# Patient Record
Sex: FEMALE | Race: WHITE | NOT HISPANIC OR LATINO | URBAN - METROPOLITAN AREA
[De-identification: names, ages, dates, MRNs, and addresses within clinical notes are randomized per-mention and may not be internally consistent; named-entity substitution may affect disease eponyms.]

---

## 2021-11-28 ENCOUNTER — INPATIENT (INPATIENT)
Facility: HOSPITAL | Age: 36
LOS: 18 days | Discharge: HOME | DRG: 885 | End: 2021-12-17
Attending: PSYCHIATRY & NEUROLOGY | Admitting: PSYCHIATRY & NEUROLOGY
Payer: SELF-PAY

## 2021-11-28 VITALS
HEART RATE: 84 BPM | TEMPERATURE: 98 F | RESPIRATION RATE: 19 BRPM | HEIGHT: 64 IN | OXYGEN SATURATION: 100 % | WEIGHT: 139.99 LBS | SYSTOLIC BLOOD PRESSURE: 159 MMHG | DIASTOLIC BLOOD PRESSURE: 80 MMHG

## 2021-11-28 DIAGNOSIS — F25.9 SCHIZOAFFECTIVE DISORDER, UNSPECIFIED: ICD-10-CM

## 2021-11-28 DIAGNOSIS — F10.20 ALCOHOL DEPENDENCE, UNCOMPLICATED: ICD-10-CM

## 2021-11-28 LAB
ALBUMIN SERPL ELPH-MCNC: 3.7 G/DL — SIGNIFICANT CHANGE UP (ref 3.3–5)
ALBUMIN SERPL ELPH-MCNC: 4.1 G/DL — SIGNIFICANT CHANGE UP (ref 3.3–5)
ALP SERPL-CCNC: 51 U/L — SIGNIFICANT CHANGE UP (ref 40–120)
ALP SERPL-CCNC: 53 U/L — SIGNIFICANT CHANGE UP (ref 40–120)
ALT FLD-CCNC: 16 U/L — SIGNIFICANT CHANGE UP (ref 10–45)
ALT FLD-CCNC: 17 U/L — SIGNIFICANT CHANGE UP (ref 10–45)
AMPHET UR-MCNC: NEGATIVE — SIGNIFICANT CHANGE UP
ANION GAP SERPL CALC-SCNC: 13 MMOL/L — SIGNIFICANT CHANGE UP (ref 5–17)
APAP SERPL-MCNC: <5 UG/ML — LOW (ref 10–30)
APPEARANCE UR: CLEAR — SIGNIFICANT CHANGE UP
AST SERPL-CCNC: 25 U/L — SIGNIFICANT CHANGE UP (ref 10–40)
AST SERPL-CCNC: 30 U/L — SIGNIFICANT CHANGE UP (ref 10–40)
BACTERIA # UR AUTO: PRESENT /HPF
BARBITURATES UR SCN-MCNC: NEGATIVE — SIGNIFICANT CHANGE UP
BASOPHILS # BLD AUTO: 0.06 K/UL — SIGNIFICANT CHANGE UP (ref 0–0.2)
BASOPHILS NFR BLD AUTO: 1.1 % — SIGNIFICANT CHANGE UP (ref 0–2)
BENZODIAZ UR-MCNC: NEGATIVE — SIGNIFICANT CHANGE UP
BILIRUB DIRECT SERPL-MCNC: 0.2 MG/DL — SIGNIFICANT CHANGE UP (ref 0–0.3)
BILIRUB INDIRECT FLD-MCNC: 0 MG/DL — LOW (ref 0.2–1)
BILIRUB SERPL-MCNC: 0.2 MG/DL — SIGNIFICANT CHANGE UP (ref 0.2–1.2)
BILIRUB SERPL-MCNC: 0.4 MG/DL — SIGNIFICANT CHANGE UP (ref 0.2–1.2)
BILIRUB UR-MCNC: NEGATIVE — SIGNIFICANT CHANGE UP
BUN SERPL-MCNC: 9 MG/DL — SIGNIFICANT CHANGE UP (ref 7–23)
CALCIUM SERPL-MCNC: 8.7 MG/DL — SIGNIFICANT CHANGE UP (ref 8.4–10.5)
CHLORIDE SERPL-SCNC: 105 MMOL/L — SIGNIFICANT CHANGE UP (ref 96–108)
CO2 SERPL-SCNC: 23 MMOL/L — SIGNIFICANT CHANGE UP (ref 22–31)
COCAINE METAB.OTHER UR-MCNC: NEGATIVE — SIGNIFICANT CHANGE UP
COLOR SPEC: YELLOW — SIGNIFICANT CHANGE UP
CREAT SERPL-MCNC: 0.55 MG/DL — SIGNIFICANT CHANGE UP (ref 0.5–1.3)
DIFF PNL FLD: ABNORMAL
EOSINOPHIL # BLD AUTO: 0.1 K/UL — SIGNIFICANT CHANGE UP (ref 0–0.5)
EOSINOPHIL NFR BLD AUTO: 1.9 % — SIGNIFICANT CHANGE UP (ref 0–6)
EPI CELLS # UR: ABNORMAL /HPF (ref 0–5)
ETHANOL SERPL-MCNC: <10 MG/DL — SIGNIFICANT CHANGE UP (ref 0–10)
GLUCOSE SERPL-MCNC: 107 MG/DL — HIGH (ref 70–99)
GLUCOSE UR QL: NEGATIVE — SIGNIFICANT CHANGE UP
HCG UR QL: NEGATIVE — SIGNIFICANT CHANGE UP
HCT VFR BLD CALC: 28.7 % — LOW (ref 34.5–45)
HGB BLD-MCNC: 9.2 G/DL — LOW (ref 11.5–15.5)
IMM GRANULOCYTES NFR BLD AUTO: 0.4 % — SIGNIFICANT CHANGE UP (ref 0–1.5)
KETONES UR-MCNC: NEGATIVE — SIGNIFICANT CHANGE UP
LEUKOCYTE ESTERASE UR-ACNC: NEGATIVE — SIGNIFICANT CHANGE UP
LYMPHOCYTES # BLD AUTO: 1.52 K/UL — SIGNIFICANT CHANGE UP (ref 1–3.3)
LYMPHOCYTES # BLD AUTO: 28.7 % — SIGNIFICANT CHANGE UP (ref 13–44)
MCHC RBC-ENTMCNC: 24.7 PG — LOW (ref 27–34)
MCHC RBC-ENTMCNC: 32.1 GM/DL — SIGNIFICANT CHANGE UP (ref 32–36)
MCV RBC AUTO: 76.9 FL — LOW (ref 80–100)
METHADONE UR-MCNC: NEGATIVE — SIGNIFICANT CHANGE UP
MONOCYTES # BLD AUTO: 0.43 K/UL — SIGNIFICANT CHANGE UP (ref 0–0.9)
MONOCYTES NFR BLD AUTO: 8.1 % — SIGNIFICANT CHANGE UP (ref 2–14)
NEUTROPHILS # BLD AUTO: 3.16 K/UL — SIGNIFICANT CHANGE UP (ref 1.8–7.4)
NEUTROPHILS NFR BLD AUTO: 59.8 % — SIGNIFICANT CHANGE UP (ref 43–77)
NITRITE UR-MCNC: NEGATIVE — SIGNIFICANT CHANGE UP
NRBC # BLD: 0 /100 WBCS — SIGNIFICANT CHANGE UP (ref 0–0)
OPIATES UR-MCNC: NEGATIVE — SIGNIFICANT CHANGE UP
PCP SPEC-MCNC: SIGNIFICANT CHANGE UP
PCP UR-MCNC: NEGATIVE — SIGNIFICANT CHANGE UP
PH UR: 6.5 — SIGNIFICANT CHANGE UP (ref 5–8)
PLATELET # BLD AUTO: 313 K/UL — SIGNIFICANT CHANGE UP (ref 150–400)
POTASSIUM SERPL-MCNC: 3.7 MMOL/L — SIGNIFICANT CHANGE UP (ref 3.5–5.3)
POTASSIUM SERPL-SCNC: 3.7 MMOL/L — SIGNIFICANT CHANGE UP (ref 3.5–5.3)
PROT SERPL-MCNC: 6.4 G/DL — SIGNIFICANT CHANGE UP (ref 6–8.3)
PROT SERPL-MCNC: 7 G/DL — SIGNIFICANT CHANGE UP (ref 6–8.3)
PROT UR-MCNC: NEGATIVE MG/DL — SIGNIFICANT CHANGE UP
RBC # BLD: 3.73 M/UL — LOW (ref 3.8–5.2)
RBC # FLD: 18 % — HIGH (ref 10.3–14.5)
RBC CASTS # UR COMP ASSIST: ABNORMAL /HPF
SALICYLATES SERPL-MCNC: <0.3 MG/DL — LOW (ref 2.8–20)
SARS-COV-2 RNA SPEC QL NAA+PROBE: SIGNIFICANT CHANGE UP
SODIUM SERPL-SCNC: 141 MMOL/L — SIGNIFICANT CHANGE UP (ref 135–145)
SP GR SPEC: 1.02 — SIGNIFICANT CHANGE UP (ref 1–1.03)
THC UR QL: NEGATIVE — SIGNIFICANT CHANGE UP
TSH SERPL-MCNC: 1.54 UIU/ML — SIGNIFICANT CHANGE UP (ref 0.27–4.2)
UROBILINOGEN FLD QL: 0.2 E.U./DL — SIGNIFICANT CHANGE UP
WBC # BLD: 5.29 K/UL — SIGNIFICANT CHANGE UP (ref 3.8–10.5)
WBC # FLD AUTO: 5.29 K/UL — SIGNIFICANT CHANGE UP (ref 3.8–10.5)
WBC UR QL: < 5 /HPF — SIGNIFICANT CHANGE UP

## 2021-11-28 PROCEDURE — 99285 EMERGENCY DEPT VISIT HI MDM: CPT

## 2021-11-28 PROCEDURE — 90792 PSYCH DIAG EVAL W/MED SRVCS: CPT | Mod: 95

## 2021-11-28 PROCEDURE — 99223 1ST HOSP IP/OBS HIGH 75: CPT

## 2021-11-28 RX ORDER — HALOPERIDOL DECANOATE 100 MG/ML
5 INJECTION INTRAMUSCULAR ONCE
Refills: 0 | Status: DISCONTINUED | OUTPATIENT
Start: 2021-11-28 | End: 2021-11-28

## 2021-11-28 RX ORDER — QUETIAPINE FUMARATE 200 MG/1
50 TABLET, FILM COATED ORAL AT BEDTIME
Refills: 0 | Status: DISCONTINUED | OUTPATIENT
Start: 2021-11-28 | End: 2021-12-17

## 2021-11-28 RX ORDER — FOLIC ACID 0.8 MG
1 TABLET ORAL DAILY
Refills: 0 | Status: DISCONTINUED | OUTPATIENT
Start: 2021-11-28 | End: 2021-12-17

## 2021-11-28 RX ORDER — THIAMINE MONONITRATE (VIT B1) 100 MG
100 TABLET ORAL DAILY
Refills: 0 | Status: COMPLETED | OUTPATIENT
Start: 2021-11-28 | End: 2021-12-01

## 2021-11-28 RX ORDER — DIPHENHYDRAMINE HCL 50 MG
50 CAPSULE ORAL ONCE
Refills: 0 | Status: DISCONTINUED | OUTPATIENT
Start: 2021-11-28 | End: 2021-11-28

## 2021-11-28 RX ORDER — QUETIAPINE FUMARATE 200 MG/1
100 TABLET, FILM COATED ORAL AT BEDTIME
Refills: 0 | Status: DISCONTINUED | OUTPATIENT
Start: 2021-11-28 | End: 2021-11-30

## 2021-11-28 RX ORDER — NICOTINE POLACRILEX 2 MG
2 GUM BUCCAL
Refills: 0 | Status: DISCONTINUED | OUTPATIENT
Start: 2021-11-28 | End: 2021-12-17

## 2021-11-28 RX ORDER — MAGNESIUM HYDROXIDE 400 MG/1
30 TABLET, CHEWABLE ORAL DAILY
Refills: 0 | Status: DISCONTINUED | OUTPATIENT
Start: 2021-11-28 | End: 2021-12-17

## 2021-11-28 RX ORDER — HALOPERIDOL DECANOATE 100 MG/ML
5 INJECTION INTRAMUSCULAR EVERY 6 HOURS
Refills: 0 | Status: DISCONTINUED | OUTPATIENT
Start: 2021-11-28 | End: 2021-12-17

## 2021-11-28 RX ORDER — ACETAMINOPHEN 500 MG
650 TABLET ORAL EVERY 6 HOURS
Refills: 0 | Status: DISCONTINUED | OUTPATIENT
Start: 2021-11-28 | End: 2021-12-17

## 2021-11-28 RX ADMIN — Medication 650 MILLIGRAM(S): at 19:28

## 2021-11-28 RX ADMIN — Medication 650 MILLIGRAM(S): at 20:58

## 2021-11-28 RX ADMIN — Medication 2 MILLIGRAM(S): at 21:16

## 2021-11-28 RX ADMIN — QUETIAPINE FUMARATE 100 MILLIGRAM(S): 200 TABLET, FILM COATED ORAL at 21:16

## 2021-11-28 NOTE — ED PROVIDER NOTE - OBJECTIVE STATEMENT
The pt is a 37 y/o F, who presents to ED c/o SI x 3 mon. On seroquel and has been taking it as Rx'd. Reports suicide attempt a yr ago by hanging herself. States that has recently lost custody of her kids, has "been traveling". Admits to drinking alcohol - last drink a few hrs ago, no hx of withdrawal sz. Has been hosp in psych unit x 3-4 times. Denies HI, AH, VH, anorexia, insomnia, cp, sob, n/v/d, any physical c/o.

## 2021-11-28 NOTE — BH INPATIENT PSYCHIATRY ASSESSMENT NOTE - NSTXPSYCHOGOAL_PSY_ALL_CORE
Be able to utilize coping skills to ignore hallucinations so that he/she could attend and participate constructively in groups

## 2021-11-28 NOTE — BH INPATIENT PSYCHIATRY ASSESSMENT NOTE - RISK ASSESSMENT
Protective factors include responsibility to children. Risk factors include poor compliance with outpatient care, hx of prior inpatient hospitalizations.

## 2021-11-28 NOTE — BH INPATIENT PSYCHIATRY ASSESSMENT NOTE - NSBHASSESSSUMMFT_PSY_ALL_CORE
37 YO F with hx of schizoaffective d/o and likely alcohol use disorder comes to ED with vague suicidal ideation sometimes saying she will jump into traffic at other times saying she'll OD on seroquel. Tangential, oddly related, elevated, +AH, and came out to [facility] suddenly to come to parade.     1)Start seroquel 100 mg qhs for schizoaffective d/o.     2)[facility] protocol    3)Obtain surgery consult to evaluate ganglion cyst of hand.

## 2021-11-28 NOTE — BH INPATIENT PSYCHIATRY ASSESSMENT NOTE - NSBHMETABOLIC_PSY_ALL_CORE_FT
BMI: BMI (kg/m2): 24 (11-28-21 @ 03:23)  HbA1c:   Glucose:   BP: 122/78 (11-28-21 @ 09:30) (122/78 - 159/80)  Lipid Panel:

## 2021-11-28 NOTE — ED ADULT TRIAGE NOTE - OTHER COMPLAINTS
CC of SI x 3 mos on seroquel +HI, +SI thinking of jumping on the traffic to end her life for she lost custody of her kids. looks disheveled

## 2021-11-28 NOTE — BH INPATIENT PSYCHIATRY ASSESSMENT NOTE - OTHER PAST PSYCHIATRIC HISTORY (INCLUDE DETAILS REGARDING ONSET, COURSE OF ILLNESS, INPATIENT/OUTPATIENT TREATMENT)
5 past psych hospitalizations in [place], hx of schizoaffective, No past suicide attempts, denies hx of violence.

## 2021-11-28 NOTE — ED ADULT NURSE REASSESSMENT NOTE - NS ED NURSE REASSESS COMMENT FT1
PT APPEARS TO BE SLEEPING COMFORTABLY IN RECLINER CHAIR. BREATHING EVEN AND UNLABORED. NO SIGNS OF DISTRESS.

## 2021-11-28 NOTE — BH INPATIENT PSYCHIATRY ASSESSMENT NOTE - OTHER
L ganglion cyst where 3d & 4th metacarpals meet fingers, painful & swollen. No signs of infection  not done odd

## 2021-11-28 NOTE — ED BEHAVIORAL HEALTH NOTE - BEHAVIORAL HEALTH NOTE
PRE-HOSPITAL COURSE  ===================  SOURCE:  Second-hand information via EMR documentation and primary RN.  DETAILS: Patient self-presented to the ED no additional pre-hospital course available    ===================  ED COURSE:   SOURCE:  Second-hand information via EMR documentation and primary RN  ARRIVAL:  Patient self-presented to the ED with no noted incidents.  BELONGINGS:  Clothing    BEHAVIOR: Complied with triage protocols –provided blood/urine, changed into a hospital gown, and allowed staff to wand/collect belongings without incident. Patient endorsed SI with thoughts of jumping into traffic. She denies HI, AH, and VH. Patient presents with fair hygiene and appears disheveled. She has been calm in the ED with no aggression or behavioral issues reported.   TREATMENT: No prn medications, restraints, security interventions or manual holds required.   VISITORS: Patient is unaccompanied by family or social supports.   ========================  COLLATERAL ATTEMPT  ========================  NAME: Meaghan Curtis   NUMBER: [phone]   RELATIONSHIP: Mother  COMMENTS: Attempted to reach to obtain collateral       COVID Exposure Screen- collateral (i.e. third-party, chart review, belongings, etc; include EMS and ED staff)  1.	*Has the patient had a COVID-19 test in the last 90 days?  (  ) Yes   (X  ) No   (  ) Unknown- Reason: _____  2.	*Has the patient tested positive for COVID-19 antibodies? (  ) Yes   ( X ) No   (  ) Unknown- Reason: _____  3.	*Has the patient received 2 doses of the COVID-19 vaccine? (X  ) Yes   (  ) No   (  ) Unknown- Reason: _____  IF YES PROCEED TO QUESTION #6. IF NO or UNKNOWN, PLEASE SKIP TO QUESTION #7.  4.	 Date of second dose: __11/3/21______  5.	*In the past 10 days, has the patient been around anyone with a positive COVID-19 test?* (  ) Yes   (  X) No   (  ) Unknown-   6.	*Has the patient been out of [place] within the past 10 days?* (X  ) Yes   (  ) No   (  ) Unknown- Reason: _____  IF YES PLEASE ANSWER THE FOLLOWING QUESTIONS:  7.	Which state/country did they go to? __[place]____  8.	Were they there over 24 hours? (X  ) Yes   (  ) No    (  ) Unknown- Reason: ______  9.	Date of return to [facility]: __11/21/21____

## 2021-11-28 NOTE — BH INPATIENT PSYCHIATRY ASSESSMENT NOTE - NSBHCHARTREVIEWVS_PSY_A_CORE FT
Vital Signs Last 24 Hrs  T(C): 36.8 (11-28-21 @ 09:30), Max: 36.8 (11-28-21 @ 09:30)  T(F): 98.3 (11-28-21 @ 09:30), Max: 98.3 (11-28-21 @ 09:30)  HR: 95 (11-28-21 @ 09:30) (84 - 95)  BP: 122/78 (11-28-21 @ 09:30) (122/78 - 159/80)  BP(mean): --  RR: 18 (11-28-21 @ 09:30) (18 - 19)  SpO2: 98% (11-28-21 @ 09:30) (98% - 100%)

## 2021-11-28 NOTE — ED PROVIDER NOTE - CLINICAL SUMMARY MEDICAL DECISION MAKING FREE TEXT BOX
pt c/o SI - no plan, on seroquel and claims to be compliant, pt disheveled and appears undomicile, admits to drinking alcohol PTA - no hx of withdrawal sz, psych labs to medically clear, psych consult - dispo as per psych 30F who p/w c/o SI - no plan, on seroquel and claims to be compliant, pt disheveled and appears undomicile, admits to drinking alcohol PTA - no hx of withdrawal sz, psych labs to medically clear, psych consult - dispo as per psych, pt signed out to day team pending psych recs.

## 2021-11-28 NOTE — ED PROVIDER NOTE - PROGRESS NOTE DETAILS
pt seen by tele psych -- voluntary admission, pending covid results Shirley: medically cleared and signed out to me pending COVID. COVID neg, will admit psych for further care.
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
## 2021-11-28 NOTE — ED ADULT NURSE NOTE - OBJECTIVE STATEMENT
36 y F, complaining of suicidal thoughts and depression, pt states she came to [place] to look for her aunt, pt does not know where aunt is, pt states when she couldn't find her, she became depressed, pt has hx of depression, and states she wants to speak to a psychologist regarding this. pt denies any acts of harm, denies any ingestions, denies chest pain, sob, nausea, vomiting, fever.
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
## 2021-11-28 NOTE — ED BEHAVIORAL HEALTH ASSESSMENT NOTE - SUMMARY
36 year old, , undomiciled (while in [place], will stay in a room at grandmother's), unemployed Female visiting from [place] with PMH of L. hand cyst and PPHx of schizoaffective do BIB self for concern for suicidal thoughts. In the past 24 hours, patient endorses running into traffic and being told by [facility] that she should go to a psychiatric hospital. Patient's narrative is often times confusing and psychiatry has been unable to get a hold of anyone who knows patient for which to obtain collateral information. As patient continuing to endorse suicidal ideation with intent or plan, will plan to admit to inpatient for further escalation o care.

## 2021-11-28 NOTE — ED BEHAVIORAL HEALTH ASSESSMENT NOTE - RISK ASSESSMENT
Protective factors include responsibility to children. Risk factors include poor compliance with outpatient care, hx of prior inpatient hospitalizations. Moderate Acute Suicide Risk
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
## 2021-11-28 NOTE — ED PROVIDER NOTE - PSYCHIATRIC, MLM
Alert and oriented to person, place, time/situation. calm and cooperative, good eye contact, slow speech

## 2021-11-28 NOTE — BH INPATIENT PSYCHIATRY ASSESSMENT NOTE - CURRENT MEDICATION
MEDICATIONS  (STANDING):  QUEtiapine 100 milliGRAM(s) Oral at bedtime    MEDICATIONS  (PRN):  acetaminophen     Tablet .. 650 milliGRAM(s) Oral every 6 hours PRN Moderate Pain (4 - 6)  aluminum hydroxide/magnesium hydroxide/simethicone Suspension 30 milliLiter(s) Oral every 4 hours PRN Dyspepsia  haloperidol     Tablet 5 milliGRAM(s) Oral every 6 hours PRN agitation  LORazepam     Tablet 2 milliGRAM(s) Oral every 6 hours PRN agitation  magnesium hydroxide Suspension 30 milliLiter(s) Oral daily PRN Constipation  nicotine  Polacrilex Gum 2 milliGRAM(s) Oral every 2 hours PRN cravings  QUEtiapine 50 milliGRAM(s) Oral at bedtime PRN insomnia

## 2021-11-29 LAB
COVID-19 SPIKE DOMAIN AB INTERP: POSITIVE
COVID-19 SPIKE DOMAIN ANTIBODY RESULT: >250 U/ML — HIGH
SARS-COV-2 IGG+IGM SERPL QL IA: >250 U/ML — HIGH
SARS-COV-2 IGG+IGM SERPL QL IA: POSITIVE

## 2021-11-29 PROCEDURE — 99233 SBSQ HOSP IP/OBS HIGH 50: CPT

## 2021-11-29 RX ADMIN — QUETIAPINE FUMARATE 100 MILLIGRAM(S): 200 TABLET, FILM COATED ORAL at 21:34

## 2021-11-29 RX ADMIN — Medication 1 APPLICATION(S): at 21:34

## 2021-11-29 RX ADMIN — Medication 2 MILLIGRAM(S): at 18:17

## 2021-11-29 NOTE — BH INPATIENT PSYCHIATRY PROGRESS NOTE - NSBHFUPINTERVALHXFT_PSY_A_CORE
Patient seen at bedside with team. Cooperative, slightly irritable. Appeared to be responding to internal stimuli at times and talking and laughing loudly to self. Unreliable historian. She shared details of what prompted her admission. 'I felt like killing myself and everyone else'. She stated that her goal was stay in the hospital for 1 week to 'get my mind back together.' Per pt she flew from [place] to [place] 1 [place] ago to [place] around and [place]. She has since been staying in a hostel with friends and intended to stay in [place] x1 month before going back to [place] to live with her grandmother. 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 She endorsed dx of schizoaffective d/o, diagnosed last year and is prescribed seroquel 100mg which she had not taken for some time prior to leaving [place]. She reports recently being served with divorce papers from , was unsure of how many children they had together initially stating 7, then later 2 or 3. Does not give permission for collateral from friends or family. Agreeable to remaining on Seroquel but wants dosage to stay at 100mg for now. Currently denies si/hi/avh. Shares concern for receiving cream for athletes food and getting ganglion cyst on palm of l hand treated, denied current pain.

## 2021-11-29 NOTE — BH INPATIENT PSYCHIATRY PROGRESS NOTE - NSBHASSESSSUMMFT_PSY_ALL_CORE
37 YO F with hx of schizoaffective d/o and likely alcohol use disorder comes to ED with vague suicidal ideation sometimes saying she will jump into traffic at other times saying she'll OD on seroquel. Tangential, oddly related, elevated, +AH, and came out to [facility] suddenly to come to parade.     1)Start seroquel 100 mg qhs for schizoaffective d/o.     2)[facility] protocol    3)Obtain surgery consult to evaluate ganglion cyst of hand.

## 2021-11-29 NOTE — BH INPATIENT PSYCHIATRY PROGRESS NOTE - NSBHCHARTREVIEWVS_PSY_A_CORE FT
Vital Signs Last 24 Hrs  T(C): 36.8 (11-29-21 @ 09:00), Max: 37.1 (11-29-21 @ 06:40)  T(F): 98.2 (11-29-21 @ 09:00), Max: 98.7 (11-29-21 @ 06:40)  HR: 102 (11-29-21 @ 09:00) (90 - 115)  BP: 132/86 (11-29-21 @ 09:00) (104/66 - 132/86)  BP(mean): --  RR: 16 (11-29-21 @ 09:00) (16 - 17)  SpO2: 98% (11-29-21 @ 09:00) (96% - 98%)

## 2021-11-29 NOTE — BH SOCIAL WORK INITIAL PSYCHOSOCIAL EVALUATION - NSBHCHILDAGEFT_PSY_ALL_CORE
Patient was internally preoccupied and unable to provide information. Patient stated children are with father.

## 2021-11-29 NOTE — BH INPATIENT PSYCHIATRY PROGRESS NOTE - OTHER
L ganglion cyst where 3d & 4th metacarpals meet fingers, painful & swollen. No signs of infection  responding to internal stimuli odd

## 2021-11-29 NOTE — BH INPATIENT PSYCHIATRY PROGRESS NOTE - CURRENT MEDICATION
MEDICATIONS  (STANDING):  clotrimazole 1% Cream 1 Application(s) Topical two times a day  folic acid 1 milliGRAM(s) Oral daily  multivitamin 1 Tablet(s) Oral daily  QUEtiapine 100 milliGRAM(s) Oral at bedtime  thiamine 100 milliGRAM(s) Oral daily    MEDICATIONS  (PRN):  acetaminophen     Tablet .. 650 milliGRAM(s) Oral every 6 hours PRN Moderate Pain (4 - 6)  aluminum hydroxide/magnesium hydroxide/simethicone Suspension 30 milliLiter(s) Oral every 4 hours PRN Dyspepsia  haloperidol     Tablet 5 milliGRAM(s) Oral every 6 hours PRN agitation, administered together with lorazepam  LORazepam     Tablet 2 milliGRAM(s) Oral every 6 hours PRN agitation, administered together with haloperidol  LORazepam     Tablet 2 milliGRAM(s) Oral every 2 hours PRN CIWA-Ar score increase by 2 points and a total score of 7 or less  magnesium hydroxide Suspension 30 milliLiter(s) Oral daily PRN Constipation  nicotine  Polacrilex Gum 2 milliGRAM(s) Oral every 2 hours PRN cravings  QUEtiapine 50 milliGRAM(s) Oral at bedtime PRN insomnia

## 2021-11-29 NOTE — CHART NOTE - NSCHARTNOTEFT_GEN_A_CORE
[facility]  PHYSICAL EXAM: Agree/Declined    VITALS: T(C): 37.1 (11-29-21 @ 06:40), Max: 37.1 (11-29-21 @ 06:40)  HR: 90 (11-29-21 @ 06:40) (90 - 115)  BP: 104/66 (11-29-21 @ 06:40) (104/66 - 122/78)  RR: 16 (11-29-21 @ 06:40) (16 - 18)  SpO2: 97% (11-29-21 @ 06:40) (96% - 98%)      GENERAL: NAD, comfortable, ambulating  HEAD:  Atraumatic, Normocephalic  EYES: EOMI, PERRLA, conjunctiva and sclera clear  ENT: Moist mucous membranes  NECK: Supple, No JVD  CHEST/LUNG: Clear to auscultation bilaterally; No rales, rhonchi, wheezing, or rubs. Unlabored respirations  HEART: Regular rate and rhythm; No murmurs, rubs, or gallops  ABDOMEN: BSx4; Soft, nontender, nondistended  EXTREMITIES:  No clubbing, cyanosis, or edema  NERVOUS SYSTEM:  A&Ox3, no focal deficits   SKIN: No rashes or lesions

## 2021-11-30 PROCEDURE — 99233 SBSQ HOSP IP/OBS HIGH 50: CPT

## 2021-11-30 RX ORDER — QUETIAPINE FUMARATE 200 MG/1
200 TABLET, FILM COATED ORAL AT BEDTIME
Refills: 0 | Status: DISCONTINUED | OUTPATIENT
Start: 2021-11-30 | End: 2021-12-01

## 2021-11-30 RX ADMIN — QUETIAPINE FUMARATE 50 MILLIGRAM(S): 200 TABLET, FILM COATED ORAL at 22:40

## 2021-11-30 RX ADMIN — Medication 100 MILLIGRAM(S): at 19:36

## 2021-11-30 RX ADMIN — Medication 2 MILLIGRAM(S): at 21:41

## 2021-11-30 RX ADMIN — Medication 1 APPLICATION(S): at 21:36

## 2021-11-30 NOTE — BH INPATIENT PSYCHIATRY PROGRESS NOTE - OTHER
L ganglion cyst where 3d & 4th metacarpals meet fingers, painful & swollen. No signs of infection   -poor dentition, missing several teeth responding to internal stimuli odd

## 2021-11-30 NOTE — BH INPATIENT PSYCHIATRY PROGRESS NOTE - NSBHFUPINTERVALHXFT_PSY_A_CORE
Patient visible on the unit, seen making calls during the morning. Overhead laughing and talking to herself when in room alone. She approaches writer on sight to request the number of 'any' [facility] in [place]. Appears distracted, irritable. She states 'I'm only here for a couple more days!' Became more irritable and walked away from writer once the topic of seroquel came up. She stated 'I only take 100 not double it'. MAR review shows that pt did take seroquel 100mg and clotrimazole cream last night, but refused all meds this morning.   Will plan to reach out to ortho for consult tomorrow re. ganglion cyst

## 2021-11-30 NOTE — BH INPATIENT PSYCHIATRY PROGRESS NOTE - NSBHMETABOLIC_PSY_ALL_CORE_FT
BMI: BMI (kg/m2): 24 (11-28-21 @ 03:23)  HbA1c:   Glucose:   BP: 131/80 (11-30-21 @ 08:15) (104/66 - 159/80)  Lipid Panel:

## 2021-11-30 NOTE — BH INPATIENT PSYCHIATRY PROGRESS NOTE - NSBHCHARTREVIEWVS_PSY_A_CORE FT
Vital Signs Last 24 Hrs  T(C): 36.4 (11-30-21 @ 08:15), Max: 36.8 (11-30-21 @ 06:00)  T(F): 97.5 (11-30-21 @ 08:15), Max: 98.3 (11-30-21 @ 06:00)  HR: 102 (11-30-21 @ 08:15) (79 - 108)  BP: 131/80 (11-30-21 @ 08:15) (107/66 - 143/84)  BP(mean): --  RR: 18 (11-30-21 @ 08:15) (18 - 18)  SpO2: 99% (11-30-21 @ 08:15) (98% - 100%)

## 2021-11-30 NOTE — BH INPATIENT PSYCHIATRY PROGRESS NOTE - NSBHASSESSSUMMFT_PSY_ALL_CORE
37 YO F with hx of schizoaffective d/o and likely alcohol use disorder comes to ED with vague suicidal ideation sometimes saying she will jump into traffic at other times saying she'll OD on seroquel. Tangential, oddly related, elevated, +AH, and [facility].     1)seroquel 100 mg qhs for schizoaffective d/o increased to 200mg qhs    2)[facility] protocol    3)Obtain surgery consult to evaluate ganglion cyst of hand.

## 2021-11-30 NOTE — BH INPATIENT PSYCHIATRY PROGRESS NOTE - CURRENT MEDICATION
MEDICATIONS  (STANDING):  clotrimazole 1% Cream 1 Application(s) Topical two times a day  folic acid 1 milliGRAM(s) Oral daily  multivitamin 1 Tablet(s) Oral daily  QUEtiapine 200 milliGRAM(s) Oral at bedtime  thiamine 100 milliGRAM(s) Oral daily    MEDICATIONS  (PRN):  acetaminophen     Tablet .. 650 milliGRAM(s) Oral every 6 hours PRN Moderate Pain (4 - 6)  aluminum hydroxide/magnesium hydroxide/simethicone Suspension 30 milliLiter(s) Oral every 4 hours PRN Dyspepsia  haloperidol     Tablet 5 milliGRAM(s) Oral every 6 hours PRN agitation, administered together with lorazepam  LORazepam     Tablet 2 milliGRAM(s) Oral every 6 hours PRN agitation, administered together with haloperidol  LORazepam     Tablet 2 milliGRAM(s) Oral every 2 hours PRN CIWA-Ar score increase by 2 points and a total score of 7 or less  magnesium hydroxide Suspension 30 milliLiter(s) Oral daily PRN Constipation  nicotine  Polacrilex Gum 2 milliGRAM(s) Oral every 2 hours PRN cravings  QUEtiapine 50 milliGRAM(s) Oral at bedtime PRN insomnia

## 2021-12-01 PROCEDURE — 99233 SBSQ HOSP IP/OBS HIGH 50: CPT

## 2021-12-01 RX ORDER — QUETIAPINE FUMARATE 200 MG/1
150 TABLET, FILM COATED ORAL AT BEDTIME
Refills: 0 | Status: DISCONTINUED | OUTPATIENT
Start: 2021-12-01 | End: 2021-12-06

## 2021-12-01 RX ADMIN — Medication 1 APPLICATION(S): at 21:17

## 2021-12-01 RX ADMIN — QUETIAPINE FUMARATE 150 MILLIGRAM(S): 200 TABLET, FILM COATED ORAL at 21:17

## 2021-12-01 NOTE — BH INPATIENT PSYCHIATRY PROGRESS NOTE - NSBHFUPINTERVALHXFT_PSY_A_CORE
Patient visible on the unit, seen making calls during the morning, at times intermittently yelling no the phone. Upon approach pt is noted to be disheveled and malodorous. She reports not wanting to take 200mg Seroquel as she thinks the number is too high, however was willing to take 150mg tonight. Clearly appears to be responding to internal stimuli as she was laughing, making unrelated comments to herself. Per staff report pt has been mostly isolative to self in her room, overheard laughing and talking to self when alone.   Ortho was contacted by writer alvaro. ganglion cyst recommendations were to provide prn pain reliever, warm compress and instruct pt to follow up with aftercare for removal.

## 2021-12-01 NOTE — BH INPATIENT PSYCHIATRY PROGRESS NOTE - NSBHCHARTREVIEWVS_PSY_A_CORE FT
Vital Signs Last 24 Hrs  T(C): 36.7 (12-01-21 @ 06:00), Max: 36.9 (11-30-21 @ 17:34)  T(F): 98 (12-01-21 @ 06:00), Max: 98.5 (11-30-21 @ 17:34)  HR: 91 (12-01-21 @ 06:00) (86 - 102)  BP: 101/66 (12-01-21 @ 06:00) (101/66 - 136/86)  BP(mean): --  RR: 18 (12-01-21 @ 06:00) (18 - 18)  SpO2: 99% (12-01-21 @ 06:00) (96% - 99%)

## 2021-12-01 NOTE — BH INPATIENT PSYCHIATRY PROGRESS NOTE - NSBHMETABOLIC_PSY_ALL_CORE_FT
BMI: BMI (kg/m2): 24 (11-28-21 @ 03:23)  HbA1c:   Glucose:   BP: 101/66 (12-01-21 @ 06:00) (101/66 - 143/84)  Lipid Panel:

## 2021-12-01 NOTE — BH INPATIENT PSYCHIATRY PROGRESS NOTE - NSBHASSESSSUMMFT_PSY_ALL_CORE
37 YO F with hx of schizoaffective d/o and likely alcohol use disorder comes to ED with vague suicidal ideation sometimes saying she will jump into traffic at other times saying she'll OD on seroquel. Tangential, oddly related, elevated, +AH, and came out to [facility] suddenly to come to parade.     1)seroquel increased to 150mg qhs  2)CIWA protocol

## 2021-12-01 NOTE — BH INPATIENT PSYCHIATRY PROGRESS NOTE - OTHER
odd malodorous responding to internal stimuli L ganglion cyst where 3d & 4th metacarpals meet fingers, painful & swollen. No signs of infection   -poor dentition, missing several teeth

## 2021-12-01 NOTE — BH INPATIENT PSYCHIATRY PROGRESS NOTE - CURRENT MEDICATION
MEDICATIONS  (STANDING):  clotrimazole 1% Cream 1 Application(s) Topical two times a day  folic acid 1 milliGRAM(s) Oral daily  multivitamin 1 Tablet(s) Oral daily  QUEtiapine 150 milliGRAM(s) Oral at bedtime    MEDICATIONS  (PRN):  acetaminophen     Tablet .. 650 milliGRAM(s) Oral every 6 hours PRN Moderate Pain (4 - 6)  aluminum hydroxide/magnesium hydroxide/simethicone Suspension 30 milliLiter(s) Oral every 4 hours PRN Dyspepsia  haloperidol     Tablet 5 milliGRAM(s) Oral every 6 hours PRN agitation, administered together with lorazepam  LORazepam     Tablet 2 milliGRAM(s) Oral every 6 hours PRN agitation, administered together with haloperidol  LORazepam     Tablet 2 milliGRAM(s) Oral every 2 hours PRN CIWA-Ar score increase by 2 points and a total score of 7 or less  magnesium hydroxide Suspension 30 milliLiter(s) Oral daily PRN Constipation  nicotine  Polacrilex Gum 2 milliGRAM(s) Oral every 2 hours PRN cravings  QUEtiapine 50 milliGRAM(s) Oral at bedtime PRN insomnia

## 2021-12-02 PROCEDURE — 99233 SBSQ HOSP IP/OBS HIGH 50: CPT

## 2021-12-02 RX ADMIN — Medication 1 APPLICATION(S): at 21:07

## 2021-12-02 RX ADMIN — QUETIAPINE FUMARATE 150 MILLIGRAM(S): 200 TABLET, FILM COATED ORAL at 21:07

## 2021-12-02 NOTE — BH INPATIENT PSYCHIATRY PROGRESS NOTE - OTHER
L ganglion cyst where 3d & 4th metacarpals meet fingers, painful & swollen. No signs of infection   -poor dentition, missing several teeth odd malodorous responding to internal stimuli

## 2021-12-02 NOTE — BH INPATIENT PSYCHIATRY PROGRESS NOTE - NSBHASSESSSUMMFT_PSY_ALL_CORE
35 YO F with hx of schizoaffective d/o and likely alcohol use disorder comes to ED with vague suicidal ideation sometimes saying she will jump into traffic at other times saying she'll OD on seroquel. Tangential, oddly related, elevated, +AH, and came out to [facility] suddenly to come to parade.     1)seroquel increased to 150mg qhs  2)CIWA protocol   35 YO F with hx of schizoaffective d/o and likely alcohol use disorder comes to ED with vague suicidal ideation sometimes saying she will jump into traffic at other times saying she'll OD on seroquel. Tangential, oddly related, elevated, +AH, [facility].     Pt submitted 72 hour letter on 12/2/21, remains psychotic, talking/laughing to self, irritable, poor self-care, poor boundaries. Has not required prns at this time.     1)seroquel 150mg qhs  2)CIWA protocol

## 2021-12-02 NOTE — BH INPATIENT PSYCHIATRY PROGRESS NOTE - NSBHMETABOLIC_PSY_ALL_CORE_FT
BMI: BMI (kg/m2): 24 (11-28-21 @ 03:23)  HbA1c:   Glucose:   BP: 102/69 (12-02-21 @ 08:54) (101/66 - 143/84)  Lipid Panel:

## 2021-12-02 NOTE — BH INPATIENT PSYCHIATRY PROGRESS NOTE - NSBHFUPINTERVALHXFT_PSY_A_CORE
Patient visible on the unit, seen in her room. Upon approach is overheard yelling and laughing to herself. She is slightly agitated upon approach. Is discharge focused and states that she has an upcoming flight back to [place]. She acknowledges hearing voices, responding to them and is having visual hallucinations, seeing her  this morning.   Aggressivel y  Patient visible on the unit, seen in her room. Upon approach is overheard yelling and laughing to herself. She is slightly agitated upon approach. Is discharge focused and states that she has an upcoming flight back to [place]. She submitted a 72 hour letter requesting discharge later in morning. She acknowledges hearing voices, responding to them and is having visual hallucinations of various things including her  which whom she reports having an argument with currently. Became agitated towards writer when process for stabilization and discharge was discussed, telling writer 'go kill yourself!'. Declined po prns.   She reported that she did take Seroquel 150mg last night. Self care remains very poor.

## 2021-12-02 NOTE — BH INPATIENT PSYCHIATRY PROGRESS NOTE - NSBHCHARTREVIEWVS_PSY_A_CORE FT
Vital Signs Last 24 Hrs  T(C): 36.8 (12-02-21 @ 08:54), Max: 36.8 (12-01-21 @ 16:30)  T(F): 98.2 (12-02-21 @ 08:54), Max: 98.3 (12-01-21 @ 16:30)  HR: 86 (12-02-21 @ 08:54) (86 - 110)  BP: 102/69 (12-02-21 @ 08:54) (102/69 - 136/86)  BP(mean): --  RR: 18 (12-02-21 @ 08:54) (18 - 19)  SpO2: 98% (12-02-21 @ 08:54) (98% - 100%)

## 2021-12-03 PROCEDURE — 99233 SBSQ HOSP IP/OBS HIGH 50: CPT

## 2021-12-03 RX ADMIN — QUETIAPINE FUMARATE 150 MILLIGRAM(S): 200 TABLET, FILM COATED ORAL at 21:41

## 2021-12-03 RX ADMIN — Medication 1 APPLICATION(S): at 21:42

## 2021-12-03 NOTE — BH INPATIENT PSYCHIATRY PROGRESS NOTE - NSBHMETABOLIC_PSY_ALL_CORE_FT
BMI: BMI (kg/m2): 24 (11-28-21 @ 03:23)  HbA1c:   Glucose:   BP: 110/78 (12-03-21 @ 09:00) (101/66 - 136/86)  Lipid Panel:

## 2021-12-03 NOTE — BH INPATIENT PSYCHIATRY PROGRESS NOTE - NSBHASSESSSUMMFT_PSY_ALL_CORE
37 YO F with hx of schizoaffective d/o and likely alcohol use disorder comes to ED with vague suicidal ideation sometimes saying she will jump into traffic at other times saying she'll OD on seroquel. Tangential, oddly related, elevated, +AH, and came out to [facility] suddenly to come to [facility].     Pt submitted 72 hour letter on 12/2/21, remains psychotic, talking/laughing to self, irritable, poor self-care, poor boundaries. Has not required prns at this time.     1)seroquel 150mg qhs  2)CIWA protocol

## 2021-12-03 NOTE — BH INPATIENT PSYCHIATRY PROGRESS NOTE - NSBHFUPINTERVALHXFT_PSY_A_CORE
Patient visible on the unit, approaches writer on sight to discuss discharge. She demanded to be discharge today, stating that she has to go back to [place] to go back to work. She is loud, elevated, irritable, malodorous and responding to internal stimuli throughout our exchange, spontaneously yelling at writer. She acknowledged hearing and seeing her  on the unit. When asked about suicidality she stated yes she was suicidal 'I want to die' but then immediately stated 'I don't want to die.' Per review of MAR pt has been taking seroquel 150mg, but is refusing to consider higher dosage to treat her psychotic sxs. Poor self-care.   Pt overheard yelling and talking to self in her room 'I'm going to die to tonight.'

## 2021-12-03 NOTE — BH INPATIENT PSYCHIATRY PROGRESS NOTE - OTHER
malodorous odd responding to internal stimuli L ganglion cyst where 3d & 4th metacarpals meet fingers, painful & swollen. No signs of infection   -poor dentition, missing several teeth

## 2021-12-03 NOTE — BH INPATIENT PSYCHIATRY PROGRESS NOTE - NSBHCHARTREVIEWVS_PSY_A_CORE FT
Vital Signs Last 24 Hrs  T(C): 36.7 (12-03-21 @ 09:00), Max: 36.7 (12-03-21 @ 09:00)  T(F): 98 (12-03-21 @ 09:00), Max: 98 (12-03-21 @ 09:00)  HR: 83 (12-03-21 @ 09:00) (83 - 86)  BP: 110/78 (12-03-21 @ 09:00) (110/78 - 131/83)  BP(mean): --  RR: 18 (12-03-21 @ 09:00) (18 - 19)  SpO2: 99% (12-03-21 @ 09:00) (99% - 100%)     Patient instructed to return to the ED or call 911 if patient experiences SI, HI, hopelessness, worsening of symptoms or has any other concerns.

## 2021-12-04 RX ADMIN — Medication 1 APPLICATION(S): at 21:31

## 2021-12-04 RX ADMIN — QUETIAPINE FUMARATE 150 MILLIGRAM(S): 200 TABLET, FILM COATED ORAL at 21:31

## 2021-12-04 RX ADMIN — Medication 1 APPLICATION(S): at 10:52

## 2021-12-04 NOTE — ED BEHAVIORAL HEALTH NOTE - BEHAVIORAL HEALTH NOTE
Pt was visible, out in the community and making her needs known. She continues to decline any need for medications and is observed talking loudly at times to herself. No self harm behaviors, so behavioral issues will continue to monitor and support.

## 2021-12-05 RX ORDER — HALOPERIDOL DECANOATE 100 MG/ML
5 INJECTION INTRAMUSCULAR ONCE
Refills: 0 | Status: COMPLETED | OUTPATIENT
Start: 2021-12-05 | End: 2021-12-05

## 2021-12-05 RX ORDER — DIPHENHYDRAMINE HCL 50 MG
50 CAPSULE ORAL ONCE
Refills: 0 | Status: COMPLETED | OUTPATIENT
Start: 2021-12-05 | End: 2021-12-05

## 2021-12-05 RX ADMIN — QUETIAPINE FUMARATE 150 MILLIGRAM(S): 200 TABLET, FILM COATED ORAL at 21:19

## 2021-12-05 RX ADMIN — Medication 1 APPLICATION(S): at 22:24

## 2021-12-05 RX ADMIN — HALOPERIDOL DECANOATE 5 MILLIGRAM(S): 100 INJECTION INTRAMUSCULAR at 13:15

## 2021-12-05 RX ADMIN — Medication 2 MILLIGRAM(S): at 13:15

## 2021-12-05 RX ADMIN — Medication 50 MILLIGRAM(S): at 13:15

## 2021-12-06 PROCEDURE — 99233 SBSQ HOSP IP/OBS HIGH 50: CPT

## 2021-12-06 RX ORDER — QUETIAPINE FUMARATE 200 MG/1
200 TABLET, FILM COATED ORAL AT BEDTIME
Refills: 0 | Status: DISCONTINUED | OUTPATIENT
Start: 2021-12-06 | End: 2021-12-17

## 2021-12-06 RX ADMIN — QUETIAPINE FUMARATE 200 MILLIGRAM(S): 200 TABLET, FILM COATED ORAL at 21:14

## 2021-12-06 RX ADMIN — Medication 2 MILLIGRAM(S): at 11:21

## 2021-12-06 RX ADMIN — Medication 1 APPLICATION(S): at 13:07

## 2021-12-06 RX ADMIN — Medication 2 MILLIGRAM(S): at 11:17

## 2021-12-06 RX ADMIN — HALOPERIDOL DECANOATE 5 MILLIGRAM(S): 100 INJECTION INTRAMUSCULAR at 11:17

## 2021-12-06 RX ADMIN — Medication 1 APPLICATION(S): at 21:15

## 2021-12-06 NOTE — BH INPATIENT PSYCHIATRY PROGRESS NOTE - NSBHFUPINTERVALHXFT_PSY_A_CORE
Patient visible on the unit, approaches writer on sight to discuss discharge. She states 'I'm voluntary I should leave when I want, I brought myself here.' We discussed court on wednesday for her retention as she submitted a 72 hour letter last week. She is anticipating being discharged on Wednesday. Pt reported feeling very anxious yesterday after an exchange with her  who told her that he wanted to be with her sister and no her. Pt then told writer that she thinks that her sister should 'kill herself.' Pt received medications yesterday with good effect. Pt did receive PO prns of ativan 2mg/haldol 5mg today after speaking with writer as she was observed loudly talking to self cursing and slapping the walls. She took the meds with out issue and verbalized that she was feeling anxious. Self-care remains poor, pt remains malodorous.  Per staff report pt did receive IM prns of ativan 2mg/haldol 5mg/benadryl 50mg yesterday due to agitation. She has since been requesting her seroquel to be increased to 200mg.

## 2021-12-06 NOTE — BH INPATIENT PSYCHIATRY PROGRESS NOTE - NSBHASSESSSUMMFT_PSY_ALL_CORE
35 YO F with hx of schizoaffective d/o and likely alcohol use disorder comes to ED with vague suicidal ideation sometimes saying she will jump into traffic at other times saying she'll OD on seroquel. Tangential, oddly related, elevated, +AH, and came out to [facility] suddenly to come to [facility].     Pt submitted 72 hour letter on 12/2/21, remains psychotic, talking/laughing to self, irritable, poor self-care, poor boundaries. Required IM prns on 12/6 due to agitation and responding to internal stimuli. Additionally received po prns on 12/6 due to responding to internal stimuli and slapping the walls. Is agreeable as of today to increasing seroquel to 200mg qhs    1)seroquel 150mg qhs to be increased to 200mg qhs 12/7/21  2)CIWA protocol

## 2021-12-06 NOTE — BH TREATMENT PLAN - NSTXPLANTHERAPYSESSIONSFT_PSY_ALL_CORE
12-04-21  Type of therapy: Coping skills  Type of session: Individual  Level of patient participation: Not engaged  Duration of participation: Less than 15 minutes  Therapy conducted by: Nursing  Therapy Summary: Pt was encouraged to participate in unit activites as tolerated.

## 2021-12-06 NOTE — BH INPATIENT PSYCHIATRY PROGRESS NOTE - CURRENT MEDICATION
MEDICATIONS  (STANDING):  clotrimazole 1% Cream 1 Application(s) Topical two times a day  folic acid 1 milliGRAM(s) Oral daily  LORazepam     Tablet 2 milliGRAM(s) Oral every 6 hours  multivitamin 1 Tablet(s) Oral daily  QUEtiapine 200 milliGRAM(s) Oral at bedtime    MEDICATIONS  (PRN):  acetaminophen     Tablet .. 650 milliGRAM(s) Oral every 6 hours PRN Moderate Pain (4 - 6)  aluminum hydroxide/magnesium hydroxide/simethicone Suspension 30 milliLiter(s) Oral every 4 hours PRN Dyspepsia  haloperidol     Tablet 5 milliGRAM(s) Oral every 6 hours PRN agitation, administered together with lorazepam  magnesium hydroxide Suspension 30 milliLiter(s) Oral daily PRN Constipation  nicotine  Polacrilex Gum 2 milliGRAM(s) Oral every 2 hours PRN cravings  QUEtiapine 50 milliGRAM(s) Oral at bedtime PRN insomnia

## 2021-12-06 NOTE — BH INPATIENT PSYCHIATRY PROGRESS NOTE - NSBHCHARTREVIEWVS_PSY_A_CORE FT
Vital Signs Last 24 Hrs  T(C): 36.7 (12-06-21 @ 09:00), Max: 36.7 (12-06-21 @ 09:00)  T(F): 98 (12-06-21 @ 09:00), Max: 98 (12-06-21 @ 09:00)  HR: 85 (12-06-21 @ 09:00) (85 - 89)  BP: 105/60 (12-06-21 @ 09:00) (105/60 - 115/81)  BP(mean): --  RR: 18 (12-06-21 @ 09:00) (18 - 18)  SpO2: 94% (12-06-21 @ 09:00) (94% - 98%)

## 2021-12-06 NOTE — BH INPATIENT PSYCHIATRY PROGRESS NOTE - NSBHMETABOLIC_PSY_ALL_CORE_FT
BMI: BMI (kg/m2): 24 (11-28-21 @ 03:23)  HbA1c:   Glucose:   BP: 105/60 (12-06-21 @ 09:00) (105/60 - 129/85)  Lipid Panel:

## 2021-12-07 PROCEDURE — 99233 SBSQ HOSP IP/OBS HIGH 50: CPT

## 2021-12-07 RX ORDER — HALOPERIDOL DECANOATE 100 MG/ML
5 INJECTION INTRAMUSCULAR ONCE
Refills: 0 | Status: COMPLETED | OUTPATIENT
Start: 2021-12-07 | End: 2021-12-07

## 2021-12-07 RX ORDER — DIPHENHYDRAMINE HCL 50 MG
50 CAPSULE ORAL ONCE
Refills: 0 | Status: COMPLETED | OUTPATIENT
Start: 2021-12-07 | End: 2021-12-07

## 2021-12-07 RX ADMIN — Medication 2 MILLIGRAM(S): at 10:09

## 2021-12-07 RX ADMIN — Medication 50 MILLIGRAM(S): at 10:09

## 2021-12-07 RX ADMIN — QUETIAPINE FUMARATE 200 MILLIGRAM(S): 200 TABLET, FILM COATED ORAL at 21:47

## 2021-12-07 RX ADMIN — Medication 2 MILLIGRAM(S): at 21:48

## 2021-12-07 RX ADMIN — Medication 1 APPLICATION(S): at 21:48

## 2021-12-07 RX ADMIN — HALOPERIDOL DECANOATE 5 MILLIGRAM(S): 100 INJECTION INTRAMUSCULAR at 10:09
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
## 2021-12-07 NOTE — BH INPATIENT PSYCHIATRY PROGRESS NOTE - NSBHCHARTREVIEWVS_PSY_A_CORE FT
Vital Signs Last 24 Hrs  T(C): 36.7 (12-07-21 @ 10:14), Max: 36.7 (12-06-21 @ 17:41)  T(F): 98 (12-07-21 @ 10:14), Max: 98 (12-06-21 @ 17:41)  HR: 94 (12-07-21 @ 10:14) (94 - 103)  BP: 101/72 (12-07-21 @ 10:14) (101/72 - 105/67)  BP(mean): --  RR: 18 (12-06-21 @ 17:41) (18 - 18)  SpO2: 99% (12-07-21 @ 10:14) (99% - 99%)

## 2021-12-07 NOTE — BH INPATIENT PSYCHIATRY PROGRESS NOTE - NSBHASSESSSUMMFT_PSY_ALL_CORE
37 YO F with hx of schizoaffective d/o and likely alcohol use disorder comes to ED with vague suicidal ideation sometimes saying she will jump into traffic at other times saying she'll OD on seroquel. Tangential, oddly related, elevated, +AH, and came out to [facility] suddenly to come to [facility].     Pt submitted 72 hour letter on 12/2/21, remains psychotic, talking/laughing to self, irritable, poor self-care, poor boundaries. Required IM prns on 12/6 due to agitation and responding to internal stimuli. Additionally received po prns on 12/6 due to responding to internal stimuli and slapping the walls. Is agreeable as of today to increasing seroquel to 200mg qhs    1)seroquel 150mg qhs to be increased to 200mg qhs 12/7/21  2)CIWA protocol

## 2021-12-07 NOTE — BH INPATIENT PSYCHIATRY PROGRESS NOTE - CURRENT MEDICATION
MEDICATIONS  (STANDING):  clotrimazole 1% Cream 1 Application(s) Topical two times a day  folic acid 1 milliGRAM(s) Oral daily  LORazepam     Tablet 2 milliGRAM(s) Oral once  multivitamin 1 Tablet(s) Oral daily  QUEtiapine 200 milliGRAM(s) Oral at bedtime    MEDICATIONS  (PRN):  acetaminophen     Tablet .. 650 milliGRAM(s) Oral every 6 hours PRN Moderate Pain (4 - 6)  aluminum hydroxide/magnesium hydroxide/simethicone Suspension 30 milliLiter(s) Oral every 4 hours PRN Dyspepsia  haloperidol     Tablet 5 milliGRAM(s) Oral every 6 hours PRN agitation, administered together with lorazepam  magnesium hydroxide Suspension 30 milliLiter(s) Oral daily PRN Constipation  nicotine  Polacrilex Gum 2 milliGRAM(s) Oral every 2 hours PRN cravings  QUEtiapine 50 milliGRAM(s) Oral at bedtime PRN insomnia

## 2021-12-07 NOTE — BH INPATIENT PSYCHIATRY PROGRESS NOTE - NSBHMETABOLIC_PSY_ALL_CORE_FT
BMI: BMI (kg/m2): 24 (11-28-21 @ 03:23)  HbA1c:   Glucose:   BP: 101/72 (12-07-21 @ 10:14) (101/72 - 128/87)  Lipid Panel:
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
## 2021-12-07 NOTE — BH INPATIENT PSYCHIATRY PROGRESS NOTE - OTHER
malodorous responding to internal stimuli L ganglion cyst where 3d & 4th metacarpals meet fingers, painful & swollen. No signs of infection   -poor dentition, missing several teeth odd

## 2021-12-08 PROCEDURE — 99233 SBSQ HOSP IP/OBS HIGH 50: CPT

## 2021-12-08 RX ADMIN — QUETIAPINE FUMARATE 200 MILLIGRAM(S): 200 TABLET, FILM COATED ORAL at 22:01

## 2021-12-08 RX ADMIN — Medication 2 MILLIGRAM(S): at 22:03

## 2021-12-08 RX ADMIN — Medication 2 MILLIGRAM(S): at 18:15

## 2021-12-08 NOTE — BH INPATIENT PSYCHIATRY PROGRESS NOTE - NSBHCHARTREVIEWVS_PSY_A_CORE FT
Vital Signs Last 24 Hrs  T(C): 36.8 (12-08-21 @ 09:00), Max: 36.8 (12-08-21 @ 09:00)  T(F): 98.2 (12-08-21 @ 09:00), Max: 98.2 (12-08-21 @ 09:00)  HR: 87 (12-08-21 @ 09:00) (87 - 97)  BP: 102/70 (12-08-21 @ 09:00) (102/70 - 103/72)  BP(mean): --  RR: 18 (12-08-21 @ 09:00) (18 - 18)  SpO2: 99% (12-08-21 @ 09:00) (99% - 99%)

## 2021-12-08 NOTE — BH INPATIENT PSYCHIATRY PROGRESS NOTE - NSBHFUPINTERVALHXFT_PSY_A_CORE
Patient visible on the unit, overheard yelling on the phone. She is calm on approach. Hygiene has improved, pt beginning to take of grooming. She is still focused on discharge and court, court adjourned until Friday. She states that her mood is more stable. She denied having mental illness and states that seroquel is for sleep. Denies si/hi. She reports some sadness regarding her  who has  from her but has custody of their 3 children. Additionally she feels that he will leave her and be with her sister. Pt states that she had not taken her seroquel while in [place] because she didn't feel safe and didn't want to sleep too heavily. She is noted to remain in behavioral control for longer period of time today. Was more reasonable during our conversation. Verbalized intent to stay in [place] for several days after discharge to continue sightseeing before returning to [place].      Writer spoke with pt's grandmother Meaghan [phone] who asked that details of convo not be shared with pt. She stated that pt had a 'bad habit of drinking' for several years. Is currently unemployed, but had worked at a [employer] in the past. 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 She does talk to herself frequently. Has no hx of violence or aggression. Has made suicidal statements in the past when angry.

## 2021-12-08 NOTE — BH INPATIENT PSYCHIATRY PROGRESS NOTE - NSBHMETABOLIC_PSY_ALL_CORE_FT
BMI: BMI (kg/m2): 24 (11-28-21 @ 03:23)  HbA1c:   Glucose:   BP: 102/70 (12-08-21 @ 09:00) (101/72 - 115/81)  Lipid Panel:

## 2021-12-09 PROCEDURE — 99233 SBSQ HOSP IP/OBS HIGH 50: CPT

## 2021-12-09 RX ORDER — NICOTINE POLACRILEX 2 MG
1 GUM BUCCAL DAILY
Refills: 0 | Status: DISCONTINUED | OUTPATIENT
Start: 2021-12-09 | End: 2021-12-17

## 2021-12-09 RX ADMIN — Medication 2 MILLIGRAM(S): at 22:45

## 2021-12-09 RX ADMIN — QUETIAPINE FUMARATE 200 MILLIGRAM(S): 200 TABLET, FILM COATED ORAL at 21:24

## 2021-12-09 NOTE — BH INPATIENT PSYCHIATRY PROGRESS NOTE - NSBHMETABOLIC_PSY_ALL_CORE_FT
BMI: BMI (kg/m2): 24 (11-28-21 @ 03:23)  HbA1c:   Glucose:   BP: 104/71 (12-09-21 @ 09:07) (101/72 - 118/78)  Lipid Panel:

## 2021-12-09 NOTE — BH INPATIENT PSYCHIATRY PROGRESS NOTE - NSBHFUPINTERVALHXFT_PSY_A_CORE
Patient visible on the unit, cooperative on approach. She wanted to discuss court tomorrow, that was adjourned yesterday, Wednesday. She is hoping for discharge on Friday. We discussed current medication regimen of Seroquel 200mg qhs. She declined any further increase in dosage and stated that it had been increased to 300mg at one time, but she had heart palpitations and it was decreased back to 200mg. Alternatives we further discussed with formulation options of LUCIA, pt did seem interested, but once it was discussed that time would be needed to assess tolerability, titration to optimal dose and transition to LUCIA she declined and verbalized that she did not want it to affect her hope for a discharge tomorrow. She also shared that she and her  both lost custody of their 3 kids, who are being taken care of by his family. She acknowledged having problem with alcohol, but feels that it is now under control. Denied si/hi/avh or paranoid ideation, states that it has 'been a while now' since she was hearing voices. Hygiene is fair.  Per staff report, pt loud at times, responding to internal stimuli

## 2021-12-09 NOTE — BH INPATIENT PSYCHIATRY PROGRESS NOTE - NSBHASSESSSUMMFT_PSY_ALL_CORE
37 YO F with hx of schizoaffective d/o and likely alcohol use disorder comes to ED with vague suicidal ideation sometimes saying she will jump into traffic at other times saying she'll OD on seroquel. Tangential, oddly related, elevated, +AH, and came out to [facility] suddenly to come to parade.     Required IM prns on 12/6 due to agitation and responding to internal stimuli. Additionally received po prns on 12/6 due to responding to internal stimuli and slapping the walls.    Pt noted to be better related today, still observed to be responding to internal stimuli, is taking seroquel 200mg qhs  1)seroquel  200mg qhs 12/7/21  2)BRYANTWA protocol

## 2021-12-09 NOTE — BH INPATIENT PSYCHIATRY PROGRESS NOTE - NSBHCHARTREVIEWVS_PSY_A_CORE FT
Vital Signs Last 24 Hrs  T(C): 36.8 (12-09-21 @ 09:07), Max: 36.8 (12-09-21 @ 09:07)  T(F): 98.2 (12-09-21 @ 09:07), Max: 98.2 (12-09-21 @ 09:07)  HR: 101 (12-09-21 @ 09:07) (98 - 101)  BP: 104/71 (12-09-21 @ 09:07) (104/71 - 118/78)  BP(mean): --  RR: 16 (12-08-21 @ 17:00) (16 - 16)  SpO2: 100% (12-08-21 @ 17:00) (100% - 100%)

## 2021-12-10 PROCEDURE — 99233 SBSQ HOSP IP/OBS HIGH 50: CPT

## 2021-12-10 RX ORDER — RISPERIDONE 4 MG/1
0.5 TABLET ORAL
Refills: 0 | Status: DISCONTINUED | OUTPATIENT
Start: 2021-12-10 | End: 2021-12-13

## 2021-12-10 RX ADMIN — Medication 1 APPLICATION(S): at 11:19

## 2021-12-10 RX ADMIN — Medication 2 MILLIGRAM(S): at 21:55

## 2021-12-10 RX ADMIN — QUETIAPINE FUMARATE 200 MILLIGRAM(S): 200 TABLET, FILM COATED ORAL at 21:55

## 2021-12-10 RX ADMIN — RISPERIDONE 0.5 MILLIGRAM(S): 4 TABLET ORAL at 21:55

## 2021-12-10 RX ADMIN — Medication 2 MILLIGRAM(S): at 13:59

## 2021-12-10 NOTE — BH INPATIENT PSYCHIATRY PROGRESS NOTE - NSBHFUPINTERVALHXFT_PSY_A_CORE
Patient visible on the unit, noted to be loudly talking and gesturing to self. On approach she states that she is talking to her  who wants her to go back to [place]. She is very irritable and responding to internal stimuli. Court scheduled for this afternoon.  Patient visible on the unit, noted to be loudly talking and gesturing to self. On approach she states that she is talking to her  who wants her to go back to [place]. She is very irritable and responding to internal stimuli. Court scheduled for this afternoon.   Patient seen later in the day, states that 'it didn't go too well.' Pt to be retained for further stabilization. Will plan to initiate Risperdal as was previously discussed with patient with intention to transition to LUCIA prior to discharge.

## 2021-12-10 NOTE — BH INPATIENT PSYCHIATRY PROGRESS NOTE - NSBHASSESSSUMMFT_PSY_ALL_CORE
35 YO F with hx of schizoaffective d/o and likely alcohol use disorder comes to ED with vague suicidal ideation sometimes saying she will jump into traffic at other times saying she'll OD on seroquel. Tangential, oddly related, elevated, +AH, and came out to [facility] suddenly to come to [facility].     Required IM prns on 12/6 due to agitation and responding to internal stimuli. Additionally received po prns on 12/6 due to responding to internal stimuli and slapping the walls.    1)seroquel  200mg qhs 12/7/21  2)CIWA protocol   35 YO F with hx of schizoaffective d/o and likely alcohol use disorder comes to ED with vague suicidal ideation sometimes saying she will jump into traffic at other times saying she'll OD on seroquel. Tangential, oddly related, elevated, +AH, and came out to [facility] suddenly to come to parade.     Required IM prns on 12/6 due to agitation and responding to internal stimuli. Additionally received po prns on 12/6 due to responding to internal stimuli and slapping the walls.    Pt went to court 12/10 for retention, and per  pt to be retained for further stabilization. Risperdal 0.5mg bid added to regimen with plan to optimize and transition to LUCIA prior to discharge.     1)seroquel  200mg qhs 12/7/21  2)BRYANTWA protocol

## 2021-12-10 NOTE — BH INPATIENT PSYCHIATRY PROGRESS NOTE - NSBHCHARTREVIEWVS_PSY_A_CORE FT
Vital Signs Last 24 Hrs  T(C): 36.9 (12-09-21 @ 17:00), Max: 36.9 (12-09-21 @ 17:00)  T(F): 98.5 (12-09-21 @ 17:00), Max: 98.5 (12-09-21 @ 17:00)  HR: 86 (12-09-21 @ 17:00) (86 - 86)  BP: 119/83 (12-09-21 @ 17:00) (119/83 - 119/83)  BP(mean): --  RR: 16 (12-09-21 @ 17:00) (16 - 16)  SpO2: 100% (12-09-21 @ 17:00) (100% - 100%)     Vital Signs Last 24 Hrs  T(C): 36.9 (12-10-21 @ 16:25), Max: 36.9 (12-10-21 @ 16:25)  T(F): 98.4 (12-10-21 @ 16:25), Max: 98.4 (12-10-21 @ 16:25)  HR: 89 (12-10-21 @ 16:25) (89 - 89)  BP: 121/81 (12-10-21 @ 16:25) (121/81 - 121/81)  BP(mean): --  RR: 19 (12-10-21 @ 16:25) (19 - 19)  SpO2: 99% (12-10-21 @ 16:25) (99% - 99%)

## 2021-12-10 NOTE — BH INPATIENT PSYCHIATRY PROGRESS NOTE - CURRENT MEDICATION
MEDICATIONS  (STANDING):  clotrimazole 1% Cream 1 Application(s) Topical two times a day  folic acid 1 milliGRAM(s) Oral daily  LORazepam     Tablet 2 milliGRAM(s) Oral once  multivitamin 1 Tablet(s) Oral daily  nicotine - 21 mG/24Hr(s) Patch 1 patch Transdermal daily  QUEtiapine 200 milliGRAM(s) Oral at bedtime    MEDICATIONS  (PRN):  acetaminophen     Tablet .. 650 milliGRAM(s) Oral every 6 hours PRN Moderate Pain (4 - 6)  aluminum hydroxide/magnesium hydroxide/simethicone Suspension 30 milliLiter(s) Oral every 4 hours PRN Dyspepsia  haloperidol     Tablet 5 milliGRAM(s) Oral every 6 hours PRN agitation, administered together with lorazepam  magnesium hydroxide Suspension 30 milliLiter(s) Oral daily PRN Constipation  nicotine  Polacrilex Gum 2 milliGRAM(s) Oral every 2 hours PRN cravings  QUEtiapine 50 milliGRAM(s) Oral at bedtime PRN insomnia   MEDICATIONS  (STANDING):  clotrimazole 1% Cream 1 Application(s) Topical two times a day  folic acid 1 milliGRAM(s) Oral daily  LORazepam     Tablet 2 milliGRAM(s) Oral once  multivitamin 1 Tablet(s) Oral daily  nicotine - 21 mG/24Hr(s) Patch 1 patch Transdermal daily  QUEtiapine 200 milliGRAM(s) Oral at bedtime  risperiDONE   Tablet 0.5 milliGRAM(s) Oral two times a day    MEDICATIONS  (PRN):  acetaminophen     Tablet .. 650 milliGRAM(s) Oral every 6 hours PRN Moderate Pain (4 - 6)  aluminum hydroxide/magnesium hydroxide/simethicone Suspension 30 milliLiter(s) Oral every 4 hours PRN Dyspepsia  haloperidol     Tablet 5 milliGRAM(s) Oral every 6 hours PRN agitation, administered together with lorazepam  magnesium hydroxide Suspension 30 milliLiter(s) Oral daily PRN Constipation  nicotine  Polacrilex Gum 2 milliGRAM(s) Oral every 2 hours PRN cravings  QUEtiapine 50 milliGRAM(s) Oral at bedtime PRN insomnia

## 2021-12-10 NOTE — BH INPATIENT PSYCHIATRY PROGRESS NOTE - NSBHMETABOLIC_PSY_ALL_CORE_FT
BMI: BMI (kg/m2): 24 (11-28-21 @ 03:23)  HbA1c:   Glucose:   BP: 119/83 (12-09-21 @ 17:00) (102/70 - 119/83)  Lipid Panel:  BMI: BMI (kg/m2): 24 (11-28-21 @ 03:23)  HbA1c:   Glucose:   BP: 121/81 (12-10-21 @ 16:25) (102/70 - 121/81)  Lipid Panel:

## 2021-12-11 RX ADMIN — QUETIAPINE FUMARATE 200 MILLIGRAM(S): 200 TABLET, FILM COATED ORAL at 22:34

## 2021-12-11 RX ADMIN — Medication 2 MILLIGRAM(S): at 09:55

## 2021-12-11 RX ADMIN — Medication 1 APPLICATION(S): at 22:35

## 2021-12-11 RX ADMIN — RISPERIDONE 0.5 MILLIGRAM(S): 4 TABLET ORAL at 09:51

## 2021-12-11 RX ADMIN — RISPERIDONE 0.5 MILLIGRAM(S): 4 TABLET ORAL at 22:35

## 2021-12-11 RX ADMIN — Medication 1 APPLICATION(S): at 09:51

## 2021-12-12 RX ADMIN — Medication 2 MILLIGRAM(S): at 16:35

## 2021-12-12 RX ADMIN — Medication 1 APPLICATION(S): at 10:04

## 2021-12-12 RX ADMIN — HALOPERIDOL DECANOATE 5 MILLIGRAM(S): 100 INJECTION INTRAMUSCULAR at 13:20

## 2021-12-12 RX ADMIN — RISPERIDONE 0.5 MILLIGRAM(S): 4 TABLET ORAL at 21:39

## 2021-12-12 RX ADMIN — Medication 2 MILLIGRAM(S): at 13:20

## 2021-12-12 RX ADMIN — Medication 2 MILLIGRAM(S): at 21:39

## 2021-12-12 RX ADMIN — RISPERIDONE 0.5 MILLIGRAM(S): 4 TABLET ORAL at 10:04

## 2021-12-12 RX ADMIN — QUETIAPINE FUMARATE 200 MILLIGRAM(S): 200 TABLET, FILM COATED ORAL at 21:38

## 2021-12-12 RX ADMIN — Medication 1 APPLICATION(S): at 21:39

## 2021-12-12 RX ADMIN — Medication 2 MILLIGRAM(S): at 10:06

## 2021-12-13 PROCEDURE — 99233 SBSQ HOSP IP/OBS HIGH 50: CPT

## 2021-12-13 RX ORDER — RISPERIDONE 4 MG/1
1 TABLET ORAL
Refills: 0 | Status: ACTIVE | OUTPATIENT
Start: 2021-12-13 | End: 2022-11-11

## 2021-12-13 RX ADMIN — Medication 2 MILLIGRAM(S): at 11:24

## 2021-12-13 RX ADMIN — RISPERIDONE 1 MILLIGRAM(S): 4 TABLET ORAL at 11:25

## 2021-12-13 RX ADMIN — Medication 1 APPLICATION(S): at 11:25

## 2021-12-13 NOTE — BH INPATIENT PSYCHIATRY PROGRESS NOTE - NSBHCHARTREVIEWVS_PSY_A_CORE FT
Vital Signs Last 24 Hrs  T(C): 36.7 (12-13-21 @ 08:25), Max: 37 (12-12-21 @ 17:38)  T(F): 98.1 (12-13-21 @ 08:25), Max: 98.6 (12-12-21 @ 17:38)  HR: 98 (12-13-21 @ 08:25) (83 - 98)  BP: 111/79 (12-13-21 @ 08:25) (111/79 - 114/56)  BP(mean): --  RR: 18 (12-13-21 @ 08:25) (18 - 18)  SpO2: 98% (12-13-21 @ 08:25) (98% - 100%)

## 2021-12-13 NOTE — BH TREATMENT PLAN - NSTXPSYCHOINTERMD_PSY_ALL_CORE
psychopharm management x 15 min daily, seroquel

## 2021-12-13 NOTE — BH TREATMENT PLAN - NSTXDEPRESINTERPR_PSY_ALL_CORE
Pt. will be invited and encouraged to attend all offered groups
Pt will continue to be invited and encouraged to attend all offered groups

## 2021-12-13 NOTE — BH TREATMENT PLAN - NSTXSUICIDGOAL_PSY_ALL_CORE
Will identify and utilize 2 coping skills
Be able to state 3 reasons for living
Be able to state 3 reasons for living

## 2021-12-13 NOTE — BH INPATIENT PSYCHIATRY PROGRESS NOTE - NSBHMETABOLIC_PSY_ALL_CORE_FT
BMI: BMI (kg/m2): 24 (11-28-21 @ 03:23)  HbA1c:   Glucose:   BP: 111/79 (12-13-21 @ 08:25) (102/71 - 121/81)  Lipid Panel:

## 2021-12-13 NOTE — BH TREATMENT PLAN - NSTXSUICIDINTERRN_PSY_ALL_CORE
Encourage patient to adhere with prescribed medications and treatment, participation in groups and unit activities, verbalization of feelings and concerns.
Encourage pt to comply with the treatment plan
0/5 NO MUSCLE MOVEMENT.

## 2021-12-13 NOTE — BH TREATMENT PLAN - NSDCCRITERIA_PSY_ALL_CORE
No longer suicidal, less psychotic & manic

## 2021-12-13 NOTE — BH TREATMENT PLAN - NSTXDEPRESINTERRN_PSY_ALL_CORE
Encourage pt to comply with the treatment plan
Encourage patient to adhere with prescribed medications and treatment, participation in groups and unit activities, verbalization of feelings and concerns.

## 2021-12-13 NOTE — BH TREATMENT PLAN - NSTXPATIENTPARTICIPATE_PSY_ALL_CORE
Patient participated in identification of needs/problems/goals for treatment
No, patient unwilling to participate
Patient participated in identification of needs/problems/goals for treatment

## 2021-12-13 NOTE — BH TREATMENT PLAN - NSTXSUICIDINTERMD_PSY_ALL_CORE
psychopharm management x 15 min daily, seroquel, supportive therapy. 

## 2021-12-13 NOTE — BH INPATIENT PSYCHIATRY PROGRESS NOTE - NSBHASSESSSUMMFT_PSY_ALL_CORE
37 YO F with hx of schizoaffective d/o and likely alcohol use disorder comes to ED with vague suicidal ideation sometimes saying she will jump into traffic at other times saying she'll OD on seroquel. Tangential, oddly related, elevated, +AH, and came out to [facility] suddenly to come to parade.     Required IM prns on 12/6 due to agitation and responding to internal stimuli. Additionally received po prns on 12/6 due to responding to internal stimuli and slapping the walls.    Pt went to court 12/10 for retention, and per  pt to be retained for further stabilization. Risperdal 0.5mg bid added to regimen with plan to optimize and transition to LUCIA prior to discharge.     Pt remains loud, internally preoccupied, has been compliant with med regimen. Risperdal to be increased to 1mg bid 12/13/21.      1)seroquel  200mg qhs 12/7/21  2)Risperdal 0.5mg bid increased to 1mg bid 12/13/21

## 2021-12-13 NOTE — BH INPATIENT PSYCHIATRY PROGRESS NOTE - NSBHFUPINTERVALHXFT_PSY_A_CORE
Patient visible on the unit, observed to be talking with a group of male peers this morning. Cooperative on approach. We discussed medication regimen and she reported liking the Risperdal and requested that the dose be increased 'it makes my mood stable.' Risperdal to be increased to 1mg bid, with plan to continue titration and transition to LUCIA prior to discharge. Pt remains agreeable with plan. Continues to report desire to explore [place] before going back to [place] before [place]. No acute medical concerns  Per staff report pt has been irritable, loud, responding to internal stimuli, provocative.

## 2021-12-14 LAB — SARS-COV-2 RNA SPEC QL NAA+PROBE: SIGNIFICANT CHANGE UP

## 2021-12-14 PROCEDURE — 99233 SBSQ HOSP IP/OBS HIGH 50: CPT

## 2021-12-14 RX ORDER — RISPERIDONE 4 MG/1
2 TABLET ORAL
Refills: 0 | Status: DISCONTINUED | OUTPATIENT
Start: 2021-12-14 | End: 2021-12-17

## 2021-12-14 RX ORDER — RISPERIDONE 4 MG/1
25 TABLET ORAL ONCE
Refills: 0 | Status: COMPLETED | OUTPATIENT
Start: 2021-12-16 | End: 2021-12-16

## 2021-12-14 RX ADMIN — Medication 1 APPLICATION(S): at 21:14

## 2021-12-14 RX ADMIN — Medication 2 MILLIGRAM(S): at 21:14

## 2021-12-14 RX ADMIN — QUETIAPINE FUMARATE 200 MILLIGRAM(S): 200 TABLET, FILM COATED ORAL at 21:13

## 2021-12-14 RX ADMIN — RISPERIDONE 2 MILLIGRAM(S): 4 TABLET ORAL at 21:13

## 2021-12-14 NOTE — BH INPATIENT PSYCHIATRY PROGRESS NOTE - NSBHCHARTREVIEWVS_PSY_A_CORE FT
Vital Signs Last 24 Hrs  T(C): 36.7 (12-14-21 @ 09:06), Max: 36.9 (12-13-21 @ 16:57)  T(F): 98 (12-14-21 @ 09:06), Max: 98.4 (12-13-21 @ 16:57)  HR: 95 (12-14-21 @ 09:06) (92 - 95)  BP: 106/68 (12-14-21 @ 09:06) (106/68 - 114/76)  BP(mean): --  RR: 18 (12-13-21 @ 16:57) (18 - 18)  SpO2: 99% (12-14-21 @ 09:06) (99% - 100%)

## 2021-12-14 NOTE — BH INPATIENT PSYCHIATRY PROGRESS NOTE - CURRENT MEDICATION
MEDICATIONS  (STANDING):  clotrimazole 1% Cream 1 Application(s) Topical two times a day  folic acid 1 milliGRAM(s) Oral daily  LORazepam     Tablet 2 milliGRAM(s) Oral once  multivitamin 1 Tablet(s) Oral daily  nicotine - 21 mG/24Hr(s) Patch 1 patch Transdermal daily  QUEtiapine 200 milliGRAM(s) Oral at bedtime  risperiDONE   Tablet 2 milliGRAM(s) Oral two times a day    MEDICATIONS  (PRN):  acetaminophen     Tablet .. 650 milliGRAM(s) Oral every 6 hours PRN Moderate Pain (4 - 6)  aluminum hydroxide/magnesium hydroxide/simethicone Suspension 30 milliLiter(s) Oral every 4 hours PRN Dyspepsia  haloperidol     Tablet 5 milliGRAM(s) Oral every 6 hours PRN agitation, administered together with lorazepam  magnesium hydroxide Suspension 30 milliLiter(s) Oral daily PRN Constipation  nicotine  Polacrilex Gum 2 milliGRAM(s) Oral every 2 hours PRN cravings  QUEtiapine 50 milliGRAM(s) Oral at bedtime PRN insomnia

## 2021-12-14 NOTE — BH INPATIENT PSYCHIATRY PROGRESS NOTE - NSBHMETABOLIC_PSY_ALL_CORE_FT
BMI: BMI (kg/m2): 24 (11-28-21 @ 03:23)  HbA1c:   Glucose:   BP: 106/68 (12-14-21 @ 09:06) (102/71 - 114/76)  Lipid Panel:

## 2021-12-14 NOTE — BH INPATIENT PSYCHIATRY PROGRESS NOTE - NSBHASSESSSUMMFT_PSY_ALL_CORE
37 YO F with hx of schizoaffective d/o and likely alcohol use disorder comes to ED with vague suicidal ideation sometimes saying she will jump into traffic at other times saying she'll OD on seroquel. Tangential, oddly related, elevated, +AH, and came out to [facility] suddenly to come to parade.     Required IM prns on 12/6 due to agitation and responding to internal stimuli. Additionally received po prns on 12/6 due to responding to internal stimuli and slapping the walls.    Pt went to court 12/10 for retention, and per  pt to be retained for further stabilization. Risperdal 0.5mg bid added to regimen with plan to optimize and transition to LUCIA prior to discharge.     Pt remains loud, internally preoccupied, has been compliant with med regimen. Risperdal to be increased to 2mg bid 12/14/21.      1)seroquel  200mg qhs 12/7/21  2)Risperdal 1mg bid increased to 12/14/21

## 2021-12-14 NOTE — BH INPATIENT PSYCHIATRY PROGRESS NOTE - NSBHFUPINTERVALHXFT_PSY_A_CORE
Patient visible on the unit, is observed going to groups this morning. States that she enjoyed it as it was about poetry and she thinks that she is very creative. She had a good night last night, slept 8 hours, is tolerating medication regimen well, denying any side effects and reporting continued stabilization of mood. Risperdal Consta to be given prior to discharge. She states that her mother has made an appointment for her on Saturday to a local [facility] for her second Pfizer vaccine. She does not want the vaccine here in the hospital. No reported si/hi/avh or paranoid ideation. Per staff report, pt has been maintaining behavioral control, at times appears to be responding to internal stimuli.

## 2021-12-15 PROCEDURE — 99233 SBSQ HOSP IP/OBS HIGH 50: CPT

## 2021-12-15 RX ADMIN — Medication 1 APPLICATION(S): at 10:22

## 2021-12-15 RX ADMIN — RISPERIDONE 2 MILLIGRAM(S): 4 TABLET ORAL at 22:34

## 2021-12-15 RX ADMIN — RISPERIDONE 2 MILLIGRAM(S): 4 TABLET ORAL at 10:22

## 2021-12-15 RX ADMIN — QUETIAPINE FUMARATE 200 MILLIGRAM(S): 200 TABLET, FILM COATED ORAL at 22:34

## 2021-12-15 RX ADMIN — Medication 1 APPLICATION(S): at 22:34

## 2021-12-15 NOTE — BH INPATIENT PSYCHIATRY PROGRESS NOTE - NSBHFUPINTERVALHXFT_PSY_A_CORE
Patient visible on the unit, observed to be interacting with peers, at times is still talking to self. She denies si/hi/avh or paranoid ideation. Future oriented and discharge focused, wanting to clarify that discharge is for Friday. No acute medical concerns. Is compliant with medication regimen, aware of plan to receive LUCIA tomorrow, Consta 25mg.

## 2021-12-15 NOTE — BH INPATIENT PSYCHIATRY PROGRESS NOTE - NSBHCHARTREVIEWVS_PSY_A_CORE FT
Vital Signs Last 24 Hrs  T(C): 36.6 (12-15-21 @ 09:00), Max: 36.7 (12-14-21 @ 17:00)  T(F): 97.8 (12-15-21 @ 09:00), Max: 98.1 (12-14-21 @ 17:00)  HR: 886 (12-15-21 @ 09:00) (93 - 886)  BP: 104/68 (12-15-21 @ 09:00) (104/68 - 119/77)  BP(mean): --  RR: 17 (12-15-21 @ 09:00) (17 - 18)  SpO2: 98% (12-15-21 @ 09:00) (98% - 99%)

## 2021-12-15 NOTE — BH INPATIENT PSYCHIATRY PROGRESS NOTE - NSBHMETABOLIC_PSY_ALL_CORE_FT
BMI: BMI (kg/m2): 24 (11-28-21 @ 03:23)  HbA1c:   Glucose:   BP: 104/68 (12-15-21 @ 09:00) (104/68 - 119/77)  Lipid Panel:

## 2021-12-15 NOTE — BH INPATIENT PSYCHIATRY PROGRESS NOTE - NSBHASSESSSUMMFT_PSY_ALL_CORE
35 YO F with hx of schizoaffective d/o and likely alcohol use disorder comes to ED with vague suicidal ideation sometimes saying she will jump into traffic at other times saying she'll OD on seroquel. Tangential, oddly related, elevated, +AH, and came out to [facility] suddenly to come to parade.     Required IM prns on 12/6 due to agitation and responding to internal stimuli. Additionally received po prns on 12/6 due to responding to internal stimuli and slapping the walls.    Pt went to court 12/10 for retention, and per  pt to be retained for further stabilization. Risperdal 0.5mg bid added to regimen with plan to optimize and transition to LUCIA prior to discharge. Pt remains loud, internally preoccupied, has been compliant with med regimen. Risperdal increased to 2mg bid 12/14/21. Plan for discharge 12/17    1)seroquel  200mg qhs 12/7/21  2)Risperdal 1mg bid increased to 2mg bid 12/14/21

## 2021-12-16 PROCEDURE — 99233 SBSQ HOSP IP/OBS HIGH 50: CPT

## 2021-12-16 RX ORDER — RISPERIDONE 4 MG/1
1 TABLET ORAL
Qty: 28 | Refills: 0
Start: 2021-12-16 | End: 2021-12-29

## 2021-12-16 RX ORDER — NICOTINE POLACRILEX 2 MG
1 GUM BUCCAL
Qty: 360 | Refills: 0
Start: 2021-12-16 | End: 2022-01-14

## 2021-12-16 RX ORDER — NICOTINE POLACRILEX 2 MG
1 GUM BUCCAL
Qty: 0 | Refills: 0 | DISCHARGE
Start: 2021-12-16 | End: 2022-01-13

## 2021-12-16 RX ORDER — QUETIAPINE FUMARATE 200 MG/1
1 TABLET, FILM COATED ORAL
Qty: 0 | Refills: 0 | DISCHARGE
Start: 2021-12-16

## 2021-12-16 RX ORDER — QUETIAPINE FUMARATE 200 MG/1
1 TABLET, FILM COATED ORAL
Qty: 14 | Refills: 0
Start: 2021-12-16 | End: 2021-12-29

## 2021-12-16 RX ORDER — NICOTINE POLACRILEX 2 MG
1 GUM BUCCAL
Qty: 0 | Refills: 0 | DISCHARGE

## 2021-12-16 RX ORDER — RISPERIDONE 4 MG/1
1 TABLET ORAL
Qty: 0 | Refills: 0 | DISCHARGE
Start: 2021-12-16

## 2021-12-16 RX ORDER — NICOTINE POLACRILEX 2 MG
1 GUM BUCCAL
Qty: 30 | Refills: 0
Start: 2021-12-16 | End: 2022-01-14

## 2021-12-16 RX ADMIN — QUETIAPINE FUMARATE 200 MILLIGRAM(S): 200 TABLET, FILM COATED ORAL at 21:28

## 2021-12-16 RX ADMIN — Medication 2 MILLIGRAM(S): at 21:29

## 2021-12-16 RX ADMIN — Medication 1 APPLICATION(S): at 21:30

## 2021-12-16 RX ADMIN — RISPERIDONE 2 MILLIGRAM(S): 4 TABLET ORAL at 21:28

## 2021-12-16 RX ADMIN — Medication 2 MILLIGRAM(S): at 17:55

## 2021-12-16 RX ADMIN — Medication 2 MILLIGRAM(S): at 10:27

## 2021-12-16 RX ADMIN — RISPERIDONE 25 MILLIGRAM(S): 4 TABLET ORAL at 12:16

## 2021-12-16 RX ADMIN — Medication 1 APPLICATION(S): at 10:29

## 2021-12-16 RX ADMIN — RISPERIDONE 2 MILLIGRAM(S): 4 TABLET ORAL at 10:27

## 2021-12-16 NOTE — BH INPATIENT PSYCHIATRY PROGRESS NOTE - NSBHFUPINTERVALCCFT_PSY_A_CORE
Stabilization of psychosis
I felt like killing myself and everyone else
I like my medications
Stabilization of psychosis
I'm only here for a couple more days!
I'm a little anxious about my injection
I like my medications
Stabilization of psychiatric symptoms
I'm voluntary I should leave when I want, I brought myself here
Stabilization of psychosis
Stabilization of psychiatric symptoms
Stabilization of psychosis
Stabilization of psychiatric symptoms
Stabilization of psychosis

## 2021-12-16 NOTE — BH INPATIENT PSYCHIATRY DISCHARGE NOTE - NSDCCPCAREPLAN_GEN_ALL_CORE_FT
PRINCIPAL DISCHARGE DIAGNOSIS  Diagnosis: Schizoaffective disorder  Assessment and Plan of Treatment: Continue current medication regimen and follow up with aftercare      SECONDARY DISCHARGE DIAGNOSES  Diagnosis: Alcohol use disorder, mild, in controlled environment  Assessment and Plan of Treatment: Continue current medication regimen and follow up with aftercare

## 2021-12-16 NOTE — BH INPATIENT PSYCHIATRY PROGRESS NOTE - NSBHINPTBILLING_PSY_ALL_CORE
28418 - Inpatient High Complexity
26212 - Inpatient High Complexity
43375 - Inpatient High Complexity
30568 - Inpatient High Complexity
04954 - Inpatient High Complexity
54468 - Inpatient High Complexity
90829 - Inpatient High Complexity
55460 - Inpatient High Complexity
86046 - Inpatient High Complexity
73273 - Inpatient High Complexity
02193 - Inpatient High Complexity
96776 - Inpatient High Complexity
05228 - Inpatient High Complexity
64036 - Inpatient High Complexity

## 2021-12-16 NOTE — BH INPATIENT PSYCHIATRY PROGRESS NOTE - NSBHFUPINTERVALHXFT_PSY_A_CORE
Patient visible on the unit, approaching writer on sight, wanting to confirm that she would be getting her Risperdal Consta shot today. She states that she intends on getting her second vaccine tomorrow after discharge. She declines offer of her second vaccination while in the hospital. No reported si/hi/avh or paranoid ideation. Sleep and appetite remain fair. Per staff report pt remains the same, loud at times, observed talking to self. No acute medical concerns. Remains compliant with med regimen.

## 2021-12-16 NOTE — BH INPATIENT PSYCHIATRY DISCHARGE NOTE - HOSPITAL COURSE
Initially very disorganized talking to self ;anxious ; suspicious; accepted Seroquel titration for mood  and disorganization with improvement.  Submitted request for hearing regarding retention and retention granted.  Risperdal added to regime as below.        Current medications acetaminophen     Tablet .. 650 milliGRAM(s) Oral every 6 hours PRN  aluminum hydroxide/magnesium hydroxide/simethicone Suspension 30 milliLiter(s) Oral every 4 hours PRN  clotrimazole 1% Cream 1 Application(s) Topical two times a day  folic acid 1 milliGRAM(s) Oral daily  haloperidol     Tablet 5 milliGRAM(s) Oral every 6 hours PRN  LORazepam     Tablet 2 milliGRAM(s) Oral once  magnesium hydroxide Suspension 30 milliLiter(s) Oral daily PRN  multivitamin 1 Tablet(s) Oral daily  nicotine  Polacrilex Gum 2 milliGRAM(s) Oral every 2 hours PRN  nicotine - 21 mG/24Hr(s) Patch 1 patch Transdermal daily  QUEtiapine 200 milliGRAM(s) Oral at bedtime  QUEtiapine 50 milliGRAM(s) Oral at bedtime PRN  risperiDONE   Tablet 2 milliGRAM(s) Oral two times a day      ;;12/16: Future oriented; looks forward to visiting the [facility] and sight seeing; ambivalent about leaving [place] to return to [place].  significant improvement in adls and more goal directed thinking ; less labile; not as blocked or internally preoccupied.  Not endorsing  suicidal or homicidal ideation intent or plans; no mention of auditory or visual hallucinations .  Thinking is congruent with affect; no peculiarities of thinking or language use.  i&j fair to poor : aware of medications and acknowledges symptoms but still not reflective in a meaningful way  on issues that impact on symptoms.   Initially very disorganized talking to self ;anxious ; suspicious; accepted Seroquel titration for mood  and disorganization with improvement.  Submitted request for hearing regarding retention and retention granted.  Risperdal added to regime as below with intent to cross taper to Risperdal.  Patient received  Risperdal CONSTA 25mg IM on 12/16/21.  no SEs reported.  Sleep  appetite ok; no pain issues.      Current medications acetaminophen     Tablet .. 650 milliGRAM(s) Oral every 6 hours PRN  aluminum hydroxide/magnesium hydroxide/simethicone Suspension 30 milliLiter(s) Oral every 4 hours PRN  clotrimazole 1% Cream 1 Application(s) Topical two times a day  folic acid 1 milliGRAM(s) Oral daily  haloperidol     Tablet 5 milliGRAM(s) Oral every 6 hours PRN  LORazepam     Tablet 2 milliGRAM(s) Oral once  magnesium hydroxide Suspension 30 milliLiter(s) Oral daily PRN  multivitamin 1 Tablet(s) Oral daily  nicotine  Polacrilex Gum 2 milliGRAM(s) Oral every 2 hours PRN  nicotine - 21 mG/24Hr(s) Patch 1 patch Transdermal daily  QUEtiapine 200 milliGRAM(s) Oral at bedtime  QUEtiapine 50 milliGRAM(s) Oral at bedtime PRN  risperiDONE   Tablet 2 milliGRAM(s) Oral two times a day      ;;12/16: Future oriented; looks forward to visiting the [facility] and sight seeing; ambivalent about leaving [place] to return to [place].  significant improvement in adls and more goal directed thinking ; less labile; not as blocked or internally preoccupied.  Not endorsing  suicidal or homicidal ideation intent or plans; no mention of auditory or visual hallucinations .  Thinking is congruent with affect; no peculiarities of thinking or language use.  i&j fair to poor : aware of medications and acknowledges symptoms but still not reflective in a meaningful way  on issues that impact on symptoms.

## 2021-12-16 NOTE — BH INPATIENT PSYCHIATRY PROGRESS NOTE - NSBHMETABOLIC_PSY_ALL_CORE_FT
BMI: BMI (kg/m2): 24 (11-28-21 @ 03:23)  HbA1c:   Glucose:   BP: 115/77 (12-15-21 @ 16:31) (104/68 - 119/77)  Lipid Panel:

## 2021-12-16 NOTE — BH INPATIENT PSYCHIATRY PROGRESS NOTE - NSBHCHARTREVIEWVS_PSY_A_CORE FT
Vital Signs Last 24 Hrs  T(C): 36.5 (12-15-21 @ 16:31), Max: 36.5 (12-15-21 @ 16:31)  T(F): 97.7 (12-15-21 @ 16:31), Max: 97.7 (12-15-21 @ 16:31)  HR: 90 (12-15-21 @ 16:31) (90 - 90)  BP: 115/77 (12-15-21 @ 16:31) (115/77 - 115/77)  BP(mean): --  RR: 18 (12-15-21 @ 16:31) (18 - 18)  SpO2: 100% (12-15-21 @ 16:31) (100% - 100%)
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
## 2021-12-16 NOTE — BH INPATIENT PSYCHIATRY PROGRESS NOTE - CURRENT MEDICATION
MEDICATIONS  (STANDING):  clotrimazole 1% Cream 1 Application(s) Topical two times a day  folic acid 1 milliGRAM(s) Oral daily  LORazepam     Tablet 2 milliGRAM(s) Oral once  multivitamin 1 Tablet(s) Oral daily  nicotine - 21 mG/24Hr(s) Patch 1 patch Transdermal daily  QUEtiapine 200 milliGRAM(s) Oral at bedtime  risperiDONE   Tablet 2 milliGRAM(s) Oral two times a day  risperiDONE Injectable, Long Acting 25 milliGRAM(s) IntraMuscular once    MEDICATIONS  (PRN):  acetaminophen     Tablet .. 650 milliGRAM(s) Oral every 6 hours PRN Moderate Pain (4 - 6)  aluminum hydroxide/magnesium hydroxide/simethicone Suspension 30 milliLiter(s) Oral every 4 hours PRN Dyspepsia  haloperidol     Tablet 5 milliGRAM(s) Oral every 6 hours PRN agitation, administered together with lorazepam  magnesium hydroxide Suspension 30 milliLiter(s) Oral daily PRN Constipation  nicotine  Polacrilex Gum 2 milliGRAM(s) Oral every 2 hours PRN cravings  QUEtiapine 50 milliGRAM(s) Oral at bedtime PRN insomnia

## 2021-12-16 NOTE — BH INPATIENT PSYCHIATRY PROGRESS NOTE - NSCGISEVERILLNESS_PSY_ALL_CORE
6 = Severely ill - disruptive pathology, behavior and function are frequently influenced by symptoms, may require assistance from others

## 2021-12-16 NOTE — BH INPATIENT PSYCHIATRY PROGRESS NOTE - NSBHASSESSSUMMFT_PSY_ALL_CORE
35 YO F with hx of schizoaffective d/o and likely alcohol use disorder comes to ED with vague suicidal ideation sometimes saying she will jump into traffic at other times saying she'll OD on seroquel. Tangential, oddly related, elevated, +AH, and came out to [facility] suddenly to come to parade.     Required IM prns on 12/6 due to agitation and responding to internal stimuli. Additionally received po prns on 12/6 due to responding to internal stimuli and slapping the walls.    Pt went to court 12/10 for retention, and per  pt to be retained for further stabilization. Risperdal 0.5mg bid added to regimen with plan to optimize and transition to LUCIA prior to discharge. Pt remains loud, internally preoccupied, has been compliant with med regimen. Risperdal increased to 2mg bid 12/14/21. Plan for discharge 12/17    1)seroquel  200mg qhs 12/7/21  2)Risperdal 1mg bid increased to 2mg bid 12/14/21

## 2021-12-16 NOTE — BH INPATIENT PSYCHIATRY PROGRESS NOTE - NSICDXBHPRIMARYDX_PSY_ALL_CORE
Schizoaffective disorder   F25.9  

## 2021-12-17 VITALS
RESPIRATION RATE: 18 BRPM | TEMPERATURE: 98 F | DIASTOLIC BLOOD PRESSURE: 74 MMHG | HEART RATE: 93 BPM | OXYGEN SATURATION: 100 % | SYSTOLIC BLOOD PRESSURE: 112 MMHG

## 2021-12-17 PROCEDURE — 99238 HOSP IP/OBS DSCHRG MGMT 30/<: CPT

## 2021-12-17 RX ADMIN — Medication 2 MILLIGRAM(S): at 11:04

## 2021-12-17 RX ADMIN — RISPERIDONE 2 MILLIGRAM(S): 4 TABLET ORAL at 11:02

## 2021-12-17 NOTE — BH INPATIENT PSYCHIATRY PROGRESS NOTE - NSICDXBHSECONDARYDX_PSY_ALL_CORE
Schizoaffective disorder, unspecified type   F25.9  Alcohol use disorder, moderate, dependence   F10.20  

## 2021-12-17 NOTE — BH INPATIENT PSYCHIATRY PROGRESS NOTE - NSTXDEPRESINTERMD_PSY_ALL_CORE
psychopharmacology

## 2021-12-17 NOTE — BH DISCHARGE NOTE NURSING/SOCIAL WORK/PSYCH REHAB - NSDCPRGOAL_PSY_ALL_CORE
Pt has attended groups increasingly over the course of her admission, and has been increasingly appropriate and better related with peers. Pt appears less internally preoccupied and is better able to engage in groups on topic. Pt has been observed talking to herself much less often and is more redirectable. Pt's laughing episodes in groups observed much less often and Pt appears more self aware. Pt can appear childlike at times and expressed interest in sightseeing in [place] after discharge. Pt demonstrates greater insight around problem areas prior to discharge, and endorses awareness of problematic drinking and interest in at least reducing her consumption and participating in 12 step program. Pt endorses intent to take her medication and utilize family and friend support. Pt completed a safety plan and received a copy to take with her.

## 2021-12-17 NOTE — BH INPATIENT PSYCHIATRY PROGRESS NOTE - NSBHMSETHTPROC_PSY_A_CORE
Perseverative/Flight of ideas
Perseverative/Flight of ideas
Disorganized/Flight of ideas
Perseverative/Flight of ideas
Disorganized/Flight of ideas
Disorganized/Flight of ideas
Perseverative/Flight of ideas
Disorganized/Circumstantial/Flight of ideas
Perseverative/Flight of ideas
Disorganized/Flight of ideas
Disorganized/Flight of ideas
Disorganized/Circumstantial/Flight of ideas
Perseverative/Flight of ideas

## 2021-12-17 NOTE — BH INPATIENT PSYCHIATRY PROGRESS NOTE - NSTXPROBPSYCHO_PSY_ALL_CORE
PSYCHOTIC SYMPTOMS

## 2021-12-17 NOTE — BH INPATIENT PSYCHIATRY PROGRESS NOTE - NSBHCONSULTIPREASON_PSY_A_CORE
other reason
danger to self; mental illness expected to respond to inpatient care

## 2021-12-17 NOTE — BH SAFETY PLAN - WARNING SIGN 1
Pt completed a written safety plan and received a copy to take with her. An additional copy has been filed on the unit in Pt's chart.

## 2021-12-17 NOTE — BH INPATIENT PSYCHIATRY PROGRESS NOTE - NSBHMSEBEHAV_PSY_A_CORE
Uncooperative
Uncooperative
Cooperative
Uncooperative
Uncooperative
Cooperative
Cooperative
Uncooperative
Cooperative
Cooperative
Uncooperative
Cooperative
Cooperative

## 2021-12-17 NOTE — BH INPATIENT PSYCHIATRY PROGRESS NOTE - NSTXDEPRESDATENEW_PSY_ALL_CORE
08-Dec-2021

## 2021-12-17 NOTE — BH INPATIENT PSYCHIATRY PROGRESS NOTE - PRN MEDS
MEDICATIONS  (PRN):  acetaminophen     Tablet .. 650 milliGRAM(s) Oral every 6 hours PRN Moderate Pain (4 - 6)  aluminum hydroxide/magnesium hydroxide/simethicone Suspension 30 milliLiter(s) Oral every 4 hours PRN Dyspepsia  haloperidol     Tablet 5 milliGRAM(s) Oral every 6 hours PRN agitation, administered together with lorazepam  magnesium hydroxide Suspension 30 milliLiter(s) Oral daily PRN Constipation  nicotine  Polacrilex Gum 2 milliGRAM(s) Oral every 2 hours PRN cravings  QUEtiapine 50 milliGRAM(s) Oral at bedtime PRN insomnia  
MEDICATIONS  (PRN):  acetaminophen     Tablet .. 650 milliGRAM(s) Oral every 6 hours PRN Moderate Pain (4 - 6)  aluminum hydroxide/magnesium hydroxide/simethicone Suspension 30 milliLiter(s) Oral every 4 hours PRN Dyspepsia  haloperidol     Tablet 5 milliGRAM(s) Oral every 6 hours PRN agitation, administered together with lorazepam  magnesium hydroxide Suspension 30 milliLiter(s) Oral daily PRN Constipation  nicotine  Polacrilex Gum 2 milliGRAM(s) Oral every 2 hours PRN cravings  QUEtiapine 50 milliGRAM(s) Oral at bedtime PRN insomnia  
MEDICATIONS  (PRN):  acetaminophen     Tablet .. 650 milliGRAM(s) Oral every 6 hours PRN Moderate Pain (4 - 6)  aluminum hydroxide/magnesium hydroxide/simethicone Suspension 30 milliLiter(s) Oral every 4 hours PRN Dyspepsia  haloperidol     Tablet 5 milliGRAM(s) Oral every 6 hours PRN agitation, administered together with lorazepam  LORazepam     Tablet 2 milliGRAM(s) Oral every 6 hours PRN agitation, administered together with haloperidol  LORazepam     Tablet 2 milliGRAM(s) Oral every 2 hours PRN CIWA-Ar score increase by 2 points and a total score of 7 or less  magnesium hydroxide Suspension 30 milliLiter(s) Oral daily PRN Constipation  nicotine  Polacrilex Gum 2 milliGRAM(s) Oral every 2 hours PRN cravings  QUEtiapine 50 milliGRAM(s) Oral at bedtime PRN insomnia  
MEDICATIONS  (PRN):  acetaminophen     Tablet .. 650 milliGRAM(s) Oral every 6 hours PRN Moderate Pain (4 - 6)  aluminum hydroxide/magnesium hydroxide/simethicone Suspension 30 milliLiter(s) Oral every 4 hours PRN Dyspepsia  haloperidol     Tablet 5 milliGRAM(s) Oral every 6 hours PRN agitation, administered together with lorazepam  magnesium hydroxide Suspension 30 milliLiter(s) Oral daily PRN Constipation  nicotine  Polacrilex Gum 2 milliGRAM(s) Oral every 2 hours PRN cravings  QUEtiapine 50 milliGRAM(s) Oral at bedtime PRN insomnia  
MEDICATIONS  (PRN):  acetaminophen     Tablet .. 650 milliGRAM(s) Oral every 6 hours PRN Moderate Pain (4 - 6)  aluminum hydroxide/magnesium hydroxide/simethicone Suspension 30 milliLiter(s) Oral every 4 hours PRN Dyspepsia  haloperidol     Tablet 5 milliGRAM(s) Oral every 6 hours PRN agitation, administered together with lorazepam  magnesium hydroxide Suspension 30 milliLiter(s) Oral daily PRN Constipation  nicotine  Polacrilex Gum 2 milliGRAM(s) Oral every 2 hours PRN cravings  QUEtiapine 50 milliGRAM(s) Oral at bedtime PRN insomnia  
MEDICATIONS  (PRN):  acetaminophen     Tablet .. 650 milliGRAM(s) Oral every 6 hours PRN Moderate Pain (4 - 6)  aluminum hydroxide/magnesium hydroxide/simethicone Suspension 30 milliLiter(s) Oral every 4 hours PRN Dyspepsia  haloperidol     Tablet 5 milliGRAM(s) Oral every 6 hours PRN agitation, administered together with lorazepam  LORazepam     Tablet 2 milliGRAM(s) Oral every 6 hours PRN agitation, administered together with haloperidol  LORazepam     Tablet 2 milliGRAM(s) Oral every 2 hours PRN CIWA-Ar score increase by 2 points and a total score of 7 or less  magnesium hydroxide Suspension 30 milliLiter(s) Oral daily PRN Constipation  nicotine  Polacrilex Gum 2 milliGRAM(s) Oral every 2 hours PRN cravings  QUEtiapine 50 milliGRAM(s) Oral at bedtime PRN insomnia  
MEDICATIONS  (PRN):  acetaminophen     Tablet .. 650 milliGRAM(s) Oral every 6 hours PRN Moderate Pain (4 - 6)  aluminum hydroxide/magnesium hydroxide/simethicone Suspension 30 milliLiter(s) Oral every 4 hours PRN Dyspepsia  haloperidol     Tablet 5 milliGRAM(s) Oral every 6 hours PRN agitation, administered together with lorazepam  magnesium hydroxide Suspension 30 milliLiter(s) Oral daily PRN Constipation  nicotine  Polacrilex Gum 2 milliGRAM(s) Oral every 2 hours PRN cravings  QUEtiapine 50 milliGRAM(s) Oral at bedtime PRN insomnia  
MEDICATIONS  (PRN):  acetaminophen     Tablet .. 650 milliGRAM(s) Oral every 6 hours PRN Moderate Pain (4 - 6)  aluminum hydroxide/magnesium hydroxide/simethicone Suspension 30 milliLiter(s) Oral every 4 hours PRN Dyspepsia  haloperidol     Tablet 5 milliGRAM(s) Oral every 6 hours PRN agitation, administered together with lorazepam  magnesium hydroxide Suspension 30 milliLiter(s) Oral daily PRN Constipation  nicotine  Polacrilex Gum 2 milliGRAM(s) Oral every 2 hours PRN cravings  QUEtiapine 50 milliGRAM(s) Oral at bedtime PRN insomnia  
MEDICATIONS  (PRN):  acetaminophen     Tablet .. 650 milliGRAM(s) Oral every 6 hours PRN Moderate Pain (4 - 6)  aluminum hydroxide/magnesium hydroxide/simethicone Suspension 30 milliLiter(s) Oral every 4 hours PRN Dyspepsia  haloperidol     Tablet 5 milliGRAM(s) Oral every 6 hours PRN agitation, administered together with lorazepam  LORazepam     Tablet 2 milliGRAM(s) Oral every 6 hours PRN agitation, administered together with haloperidol  LORazepam     Tablet 2 milliGRAM(s) Oral every 2 hours PRN CIWA-Ar score increase by 2 points and a total score of 7 or less  magnesium hydroxide Suspension 30 milliLiter(s) Oral daily PRN Constipation  nicotine  Polacrilex Gum 2 milliGRAM(s) Oral every 2 hours PRN cravings  QUEtiapine 50 milliGRAM(s) Oral at bedtime PRN insomnia  
MEDICATIONS  (PRN):  acetaminophen     Tablet .. 650 milliGRAM(s) Oral every 6 hours PRN Moderate Pain (4 - 6)  aluminum hydroxide/magnesium hydroxide/simethicone Suspension 30 milliLiter(s) Oral every 4 hours PRN Dyspepsia  haloperidol     Tablet 5 milliGRAM(s) Oral every 6 hours PRN agitation, administered together with lorazepam  LORazepam     Tablet 2 milliGRAM(s) Oral every 6 hours PRN agitation, administered together with haloperidol  LORazepam     Tablet 2 milliGRAM(s) Oral every 2 hours PRN CIWA-Ar score increase by 2 points and a total score of 7 or less  magnesium hydroxide Suspension 30 milliLiter(s) Oral daily PRN Constipation  nicotine  Polacrilex Gum 2 milliGRAM(s) Oral every 2 hours PRN cravings  QUEtiapine 50 milliGRAM(s) Oral at bedtime PRN insomnia  
MEDICATIONS  (PRN):  acetaminophen     Tablet .. 650 milliGRAM(s) Oral every 6 hours PRN Moderate Pain (4 - 6)  aluminum hydroxide/magnesium hydroxide/simethicone Suspension 30 milliLiter(s) Oral every 4 hours PRN Dyspepsia  haloperidol     Tablet 5 milliGRAM(s) Oral every 6 hours PRN agitation, administered together with lorazepam  magnesium hydroxide Suspension 30 milliLiter(s) Oral daily PRN Constipation  nicotine  Polacrilex Gum 2 milliGRAM(s) Oral every 2 hours PRN cravings  QUEtiapine 50 milliGRAM(s) Oral at bedtime PRN insomnia  
MEDICATIONS  (PRN):  acetaminophen     Tablet .. 650 milliGRAM(s) Oral every 6 hours PRN Moderate Pain (4 - 6)  aluminum hydroxide/magnesium hydroxide/simethicone Suspension 30 milliLiter(s) Oral every 4 hours PRN Dyspepsia  haloperidol     Tablet 5 milliGRAM(s) Oral every 6 hours PRN agitation, administered together with lorazepam  magnesium hydroxide Suspension 30 milliLiter(s) Oral daily PRN Constipation  nicotine  Polacrilex Gum 2 milliGRAM(s) Oral every 2 hours PRN cravings  QUEtiapine 50 milliGRAM(s) Oral at bedtime PRN insomnia  
MEDICATIONS  (PRN):  acetaminophen     Tablet .. 650 milliGRAM(s) Oral every 6 hours PRN Moderate Pain (4 - 6)  aluminum hydroxide/magnesium hydroxide/simethicone Suspension 30 milliLiter(s) Oral every 4 hours PRN Dyspepsia  haloperidol     Tablet 5 milliGRAM(s) Oral every 6 hours PRN agitation, administered together with lorazepam  LORazepam     Tablet 2 milliGRAM(s) Oral every 6 hours PRN agitation, administered together with haloperidol  LORazepam     Tablet 2 milliGRAM(s) Oral every 2 hours PRN CIWA-Ar score increase by 2 points and a total score of 7 or less  magnesium hydroxide Suspension 30 milliLiter(s) Oral daily PRN Constipation  nicotine  Polacrilex Gum 2 milliGRAM(s) Oral every 2 hours PRN cravings  QUEtiapine 50 milliGRAM(s) Oral at bedtime PRN insomnia  
MEDICATIONS  (PRN):  acetaminophen     Tablet .. 650 milliGRAM(s) Oral every 6 hours PRN Moderate Pain (4 - 6)  aluminum hydroxide/magnesium hydroxide/simethicone Suspension 30 milliLiter(s) Oral every 4 hours PRN Dyspepsia  haloperidol     Tablet 5 milliGRAM(s) Oral every 6 hours PRN agitation, administered together with lorazepam  magnesium hydroxide Suspension 30 milliLiter(s) Oral daily PRN Constipation  nicotine  Polacrilex Gum 2 milliGRAM(s) Oral every 2 hours PRN cravings  QUEtiapine 50 milliGRAM(s) Oral at bedtime PRN insomnia  
MEDICATIONS  (PRN):  acetaminophen     Tablet .. 650 milliGRAM(s) Oral every 6 hours PRN Moderate Pain (4 - 6)  aluminum hydroxide/magnesium hydroxide/simethicone Suspension 30 milliLiter(s) Oral every 4 hours PRN Dyspepsia  haloperidol     Tablet 5 milliGRAM(s) Oral every 6 hours PRN agitation, administered together with lorazepam  magnesium hydroxide Suspension 30 milliLiter(s) Oral daily PRN Constipation  nicotine  Polacrilex Gum 2 milliGRAM(s) Oral every 2 hours PRN cravings  QUEtiapine 50 milliGRAM(s) Oral at bedtime PRN insomnia

## 2021-12-17 NOTE — BH DISCHARGE NOTE NURSING/SOCIAL WORK/PSYCH REHAB - NSCDUDCCRISIS_PSY_A_CORE
[facility] Well  [phone] [facility]-WELL ([phone])  Text "WELL" to 64813  Website: www.[employer]/.[employer] 1 (466) 711-JWNJ (1921) Website: www.safehorizon.org/.National Suicide Prevention Lifeline 7 (879) 468-0815/.  [employer]  1 (116) LIFENET (254-8653)/.  [facility]  [phone] [place] [address] [phone]   Hours:  Monday through Friday from 9 AM to 3 PM [facility] Well  1 [phone]) [facility]-WELL ([phone])  Text "WELL" to 53200  Website: www.[employer]/.[employer] 1 (426) 161-MUFN (3536) Website: www.safehorizon.org/.National Suicide Prevention Lifeline 1 (230) 899-7098/.  [employer]  1 (267) LIFENET (277-7114)/.  [facility]’s Behavioral Health Crisis [facility]  [phone] [place] [address] [phone]   Hours:  Monday through Friday from 9 AM to 3 PM/.  [employer] [facility] Crisis Line  [phone], Option 1

## 2021-12-17 NOTE — BH INPATIENT PSYCHIATRY PROGRESS NOTE - NSBHMSEAFFRANGE_PSY_A_CORE
Full
Full
Constricted
Full
Full
Constricted
Full
Constricted
Full
Constricted

## 2021-12-17 NOTE — BH INPATIENT PSYCHIATRY PROGRESS NOTE - NSTXDEPRESGOALOTHER_PSY_ALL_CORE
Pt will participate in groups and milieu tx structure of unit
Patient will stabilize mood and alleviate symptoms of depression to engage in discharge planning.
Pt will participate in groups and milieu tx structure of unit
Pt will participate in groups and milieu tx structure of unit
Patient will stabilize mood and alleviate symptoms of depression to engage in discharge planning.
Pt will participate in groups and milieu tx structure of unit
Pt will participate in groups and milieu tx structure of unit

## 2021-12-17 NOTE — BH INPATIENT PSYCHIATRY PROGRESS NOTE - NSTXPSYCHODATEEST_PSY_ALL_CORE
28-Nov-2021

## 2021-12-17 NOTE — BH INPATIENT PSYCHIATRY PROGRESS NOTE - NSTXPSYCHOINTERMD_PSY_ALL_CORE
psychopharm management x 15 min daily, seroquel

## 2021-12-17 NOTE — BH INPATIENT PSYCHIATRY PROGRESS NOTE - NSTXDEPRESPROGRES_PSY_ALL_CORE
Improving
No Change
Improving
Improving
No Change
Improving
No Change
Improving

## 2021-12-17 NOTE — BH INPATIENT PSYCHIATRY PROGRESS NOTE - NSTXPSYCHOPROGRES_PSY_ALL_CORE
No Change
No Change
Improving
Improving
No Change
Improving
No Change

## 2021-12-17 NOTE — BH DISCHARGE NOTE NURSING/SOCIAL WORK/PSYCH REHAB - PATIENT PORTAL LINK FT
You can access the FollowMyHealth Patient Portal offered by [facility] by registering at the following website: [email or web address]. By joining [employer]’s FollowMyHealth portal, you will also be able to view your health information using other applications (apps) compatible with our system.

## 2021-12-17 NOTE — BH INPATIENT PSYCHIATRY PROGRESS NOTE - NSBHMETABOLIC_PSY_ALL_CORE_FT
BMI: BMI (kg/m2): 24 (11-28-21 @ 03:23)  HbA1c:   Glucose:   BP: 120/81 (12-16-21 @ 17:18) (104/68 - 120/81)  Lipid Panel:

## 2021-12-17 NOTE — BH INPATIENT PSYCHIATRY PROGRESS NOTE - NSTXDEPRESDATEEST_PSY_ALL_CORE
13-Dec-2021
01-Dec-2021
06-Dec-2021
06-Dec-2021
29-Nov-2021
01-Dec-2021
13-Dec-2021
06-Dec-2021
01-Dec-2021
06-Dec-2021
13-Dec-2021
06-Dec-2021
29-Nov-2021

## 2021-12-17 NOTE — BH INPATIENT PSYCHIATRY PROGRESS NOTE - NSTXSUICIDGOAL_PSY_ALL_CORE
Be able to state 3 reasons for living
Will identify and utilize 2 coping skills
Be able to state 3 reasons for living
Will identify and utilize 2 coping skills

## 2021-12-17 NOTE — BH INPATIENT PSYCHIATRY PROGRESS NOTE - NSTXDEPRESGOAL_PSY_ALL_CORE
Other...
Other...
Exhibit improvements in self-grooming, hygiene, sleep and appetite
Exhibit improvements in self-grooming, hygiene, sleep and appetite
Other...
Exhibit improvements in self-grooming, hygiene, sleep and appetite
Other...
Other...

## 2021-12-17 NOTE — BH INPATIENT PSYCHIATRY PROGRESS NOTE - NSBHMSEAFFCONG_PSY_A_CORE
Non-congruent
Congruent
Non-congruent
Congruent
Non-congruent
Congruent
Non-congruent
Non-congruent
Congruent

## 2021-12-17 NOTE — BH INPATIENT PSYCHIATRY PROGRESS NOTE - NSBHMSEBODY_PSY_A_CORE
Average build

## 2021-12-17 NOTE — BH INPATIENT PSYCHIATRY PROGRESS NOTE - NSBHMSETHTCONTENT_PSY_A_CORE
Suicidality
Suicidality
Unremarkable
Suicidality
Unremarkable
Suicidality
Suicidality
Unremarkable
Unremarkable
Suicidality

## 2021-12-17 NOTE — BH INPATIENT PSYCHIATRY PROGRESS NOTE - NSTXSUICIDINTERMD_PSY_ALL_CORE
psychopharm management x 15 min daily, seroquel, supportive therapy. 

## 2021-12-17 NOTE — BH INPATIENT PSYCHIATRY PROGRESS NOTE - NSTXSUICIDDATEEST_PSY_ALL_CORE
01-Dec-2021
28-Nov-2021
01-Dec-2021
28-Nov-2021
01-Dec-2021

## 2021-12-17 NOTE — BH INPATIENT PSYCHIATRY PROGRESS NOTE - NSBHMSESPEECH_PSY_A_CORE
Abnormal as indicated, otherwise normal...
HX of significant HTN. Per OSH on hydralazine 100 Q8 PO, Linsiopril 40 PO, Metoprolol XL 25 BID, Nifedipine 60mg daily Clonidine 0.1 Patch Q week, placed on Tuesday      - Labetalol PRN     
Abnormal as indicated, otherwise normal...
Normal volume, rate, productivity, spontaneity and articulation
Abnormal as indicated, otherwise normal...
Normal volume, rate, productivity, spontaneity and articulation
Abnormal as indicated, otherwise normal...
Normal volume, rate, productivity, spontaneity and articulation

## 2021-12-17 NOTE — BH INPATIENT PSYCHIATRY PROGRESS NOTE - NSTXDEPRESDATETRGT_PSY_ALL_CORE
20-Dec-2021
13-Dec-2021
13-Dec-2021
06-Dec-2021
13-Dec-2021
20-Dec-2021
13-Dec-2021
08-Dec-2021
20-Dec-2021
20-Dec-2021
08-Dec-2021
13-Dec-2021
08-Dec-2021
06-Dec-2021
20-Dec-2021

## 2021-12-17 NOTE — BH INPATIENT PSYCHIATRY PROGRESS NOTE - NSBHMSEPERCEPT_PSY_A_CORE
Auditory hallucinations/Other
no
Auditory hallucinations/Other

## 2021-12-17 NOTE — BH INPATIENT PSYCHIATRY PROGRESS NOTE - NSTXSUICIDPROGRES_PSY_ALL_CORE
Improving
No Change
No Change
Improving
No Change
Improving

## 2021-12-17 NOTE — BH INPATIENT PSYCHIATRY PROGRESS NOTE - NSDCCRITERIA_PSY_ALL_CORE
No longer suicidal, less psychotic & manic

## 2021-12-17 NOTE — BH INPATIENT PSYCHIATRY PROGRESS NOTE - NSBHCHARTREVIEWVS_PSY_A_CORE FT
Vital Signs Last 24 Hrs  T(C): 36.8 (12-16-21 @ 17:18), Max: 36.8 (12-16-21 @ 17:18)  T(F): 98.2 (12-16-21 @ 17:18), Max: 98.2 (12-16-21 @ 17:18)  HR: 102 (12-16-21 @ 17:18) (102 - 102)  BP: 120/81 (12-16-21 @ 17:18) (120/81 - 120/81)  BP(mean): --  RR: 18 (12-16-21 @ 17:18) (18 - 18)  SpO2: 97% (12-16-21 @ 17:18) (97% - 97%)

## 2021-12-17 NOTE — BH INPATIENT PSYCHIATRY PROGRESS NOTE - NSBHMSEAFFQUAL_PSY_A_CORE
Elevated
Elevated/Irritable
Euthymic
Elevated/Irritable
Elevated
Euthymic
Elevated/Irritable
Elevated/Irritable
Euthymic
Euthymic

## 2021-12-17 NOTE — BH INPATIENT PSYCHIATRY PROGRESS NOTE - NSTXPSYCHODATETRGT_PSY_ALL_CORE
05-Dec-2021

## 2021-12-17 NOTE — BH INPATIENT PSYCHIATRY PROGRESS NOTE - NSTXSUICIDDATETRGT_PSY_ALL_CORE
08-Dec-2021
05-Dec-2021
08-Dec-2021
05-Dec-2021
08-Dec-2021

## 2021-12-17 NOTE — BH INPATIENT PSYCHIATRY PROGRESS NOTE - NSBHMSEMOOD_PSY_A_CORE
Irritable
Irritable/Angry
Irritable
Irritable/Angry
Irritable
Irritable
Depressed/Anxious
Irritable
Depressed/Anxious
Irritable
Irritable

## 2021-12-21 DIAGNOSIS — Z91.51 PERSONAL HISTORY OF SUICIDAL BEHAVIOR: ICD-10-CM

## 2021-12-21 DIAGNOSIS — Z63.5 DISRUPTION OF FAMILY BY SEPARATION AND DIVORCE: ICD-10-CM

## 2021-12-21 DIAGNOSIS — F25.9 SCHIZOAFFECTIVE DISORDER, UNSPECIFIED: ICD-10-CM

## 2021-12-21 DIAGNOSIS — M67.442 GANGLION, LEFT HAND: ICD-10-CM

## 2021-12-21 DIAGNOSIS — F10.20 ALCOHOL DEPENDENCE, UNCOMPLICATED: ICD-10-CM

## 2021-12-21 DIAGNOSIS — K00.0 ANODONTIA: ICD-10-CM

## 2021-12-21 DIAGNOSIS — R45.851 SUICIDAL IDEATIONS: ICD-10-CM

## 2021-12-21 DIAGNOSIS — Z65.3 PROBLEMS RELATED TO OTHER LEGAL CIRCUMSTANCES: ICD-10-CM

## 2021-12-21 SDOH — SOCIAL STABILITY - SOCIAL INSECURITY: DISRUPTION OF FAMILY BY SEPARATION AND DIVORCE: Z63.5

## 2022-01-06 PROCEDURE — 36415 COLL VENOUS BLD VENIPUNCTURE: CPT

## 2022-01-06 PROCEDURE — 84443 ASSAY THYROID STIM HORMONE: CPT

## 2022-01-06 PROCEDURE — 80053 COMPREHEN METABOLIC PANEL: CPT

## 2022-01-06 PROCEDURE — 99285 EMERGENCY DEPT VISIT HI MDM: CPT | Mod: 25

## 2022-01-06 PROCEDURE — 81001 URINALYSIS AUTO W/SCOPE: CPT

## 2022-01-06 PROCEDURE — 80076 HEPATIC FUNCTION PANEL: CPT

## 2022-01-06 PROCEDURE — 85025 COMPLETE CBC W/AUTO DIFF WBC: CPT

## 2022-01-06 PROCEDURE — 81025 URINE PREGNANCY TEST: CPT

## 2022-01-06 PROCEDURE — 87635 SARS-COV-2 COVID-19 AMP PRB: CPT

## 2022-01-06 PROCEDURE — 80307 DRUG TEST PRSMV CHEM ANLYZR: CPT

## 2022-01-06 PROCEDURE — 86769 SARS-COV-2 COVID-19 ANTIBODY: CPT

## 2022-01-06 PROCEDURE — U0003: CPT

## 2022-01-06 PROCEDURE — U0005: CPT

## 2022-05-11 NOTE — BH INPATIENT PSYCHIATRY PROGRESS NOTE - NS ED BHA REVIEW OF ED CHART VITAL SIGNS REVIEWED
[FreeTextEntry1] : doing well\par path reviewed copy given\par rec f/u with Dr. Chahal\par f/u 2 month\par She knows to call or return sooner should any concerns or questions arise.\par 
Yes

## 2023-03-30 NOTE — BH INPATIENT PSYCHIATRY PROGRESS NOTE - NSBHASSESSSUMMFT_PSY_ALL_CORE
37 YO F with hx of schizoaffective d/o and likely alcohol use disorder comes to ED with vague suicidal ideation sometimes saying she will jump into traffic at other times saying she'll OD on seroquel. Tangential, oddly related, elevated, +AH, and came out to [facility] suddenly to come to [facility].     Pt submitted 72 hour letter on 12/2/21, remains psychotic, talking/laughing to self, irritable, poor self-care, poor boundaries. Required IM prns on 12/6 due to agitation and responding to internal stimuli. Additionally received po prns on 12/6 due to responding to internal stimuli and slapping the walls.    Pt noted to be better related today, still loud, still overheard talking to self. Provided collateral info for her grandmother.  1)seroquel  200mg qhs 12/7/21  2)CIWA protocol   Complex Repair And Dermal Autograft Text: The defect edges were debeveled with a #15 scalpel blade.  The primary defect was closed partially with a complex linear closure.  Given the location of the defect, shape of the defect and the proximity to free margins an dermal autograft was deemed most appropriate to repair the remaining defect.  The graft was trimmed to fit the size of the remaining defect.  The graft was then placed in the primary defect, oriented appropriately, and sutured into place.

## 2023-08-15 NOTE — BH INPATIENT PSYCHIATRY ASSESSMENT NOTE - SELF INJURIOUS BEHAVIOR WITHOUT SUICIDAL INTENT:
cancelled patients US for today (8/15/23) as patient had an [address] done in the hospital. Ani Chun left a voice message for patient to call the office to be scheduled for OB education. None known

## 2024-05-07 NOTE — BH INPATIENT PSYCHIATRY ASSESSMENT NOTE - NSBHMSEIMPULSE_PSY_A_CORE
PERCY Louise from [facility] called and left message stating that she was completing the home assessment visit for insurance and that patient would benefit from a rolling walker with a seat. She states that patient has been reaching out and looking for furniture while walking. She states that the reason for the gait disturbance is unclear. They just wanted to make PCP aware. Aspen may be reached at [phone]. Please advise thank you    Normal